# Patient Record
Sex: FEMALE | Race: WHITE | NOT HISPANIC OR LATINO | Employment: FULL TIME | URBAN - NONMETROPOLITAN AREA
[De-identification: names, ages, dates, MRNs, and addresses within clinical notes are randomized per-mention and may not be internally consistent; named-entity substitution may affect disease eponyms.]

---

## 2023-06-16 PROBLEM — Z12.11 ENCOUNTER FOR SCREENING COLONOSCOPY: Status: ACTIVE | Noted: 2023-06-16

## 2023-06-16 PROBLEM — Z11.59 ENCOUNTER FOR HEPATITIS C SCREENING TEST FOR LOW RISK PATIENT: Chronic | Status: ACTIVE | Noted: 2023-06-16

## 2023-06-16 PROBLEM — N92.0 MENORRHAGIA: Status: ACTIVE | Noted: 2023-06-16

## 2023-06-16 PROBLEM — R03.0 ELEVATED BLOOD-PRESSURE READING WITHOUT DIAGNOSIS OF HYPERTENSION: Status: ACTIVE | Noted: 2023-06-16

## 2023-06-16 PROBLEM — R31.9 HEMATURIA: Status: ACTIVE | Noted: 2023-06-16

## 2023-06-16 PROBLEM — D50.9 IRON DEFICIENCY ANEMIA: Status: ACTIVE | Noted: 2023-06-16

## 2023-06-16 PROBLEM — E55.9 VITAMIN D DEFICIENCY: Status: ACTIVE | Noted: 2023-06-16

## 2023-06-16 PROBLEM — Z11.59 ENCOUNTER FOR HEPATITIS C SCREENING TEST FOR LOW RISK PATIENT: Status: ACTIVE | Noted: 2023-06-16

## 2023-06-16 PROBLEM — M25.50 ARTHRALGIA OF MULTIPLE JOINTS: Status: ACTIVE | Noted: 2023-06-16

## 2023-06-16 PROBLEM — Z12.11 ENCOUNTER FOR SCREENING COLONOSCOPY: Chronic | Status: ACTIVE | Noted: 2023-06-16

## 2023-06-16 RX ORDER — MULTIVITAMIN
TABLET ORAL
COMMUNITY

## 2023-06-16 RX ORDER — FERROUS SULFATE 325(65) MG
TABLET ORAL
COMMUNITY
End: 2023-06-20 | Stop reason: WASHOUT

## 2023-06-16 RX ORDER — TRANEXAMIC ACID 650 MG/1
TABLET ORAL
COMMUNITY
Start: 2021-05-20 | End: 2023-06-20 | Stop reason: WASHOUT

## 2023-06-18 NOTE — PROGRESS NOTES
"HPI:    Milagros Henson is a 38 y.o. female is here today for PE/health maintenance    GYN- Dr Farrell- menorrhagia, anemia    Tdap 2014 2021 Dr Booker colonoscopy      Pt has noticed some reflux /gerd- discussed starting omeprazole and following up with GI       Pt also due for f/up chronic medical problems-elevated BP, vit d , allergies     Other medical specialists are involved in patient's care.    Any recent notes that were available were reviewed.    All conditions are monitored, evaluated and assessed regularly.    Patient is compliant with current treatment regimen/medications.    Specialists involved include:      Dr Galicia    Health Maintenance   Topic     Yearly Adult Physical  today    Hepatitis C Screening  Completed    HIB Vaccines  Aged Out    IPV Vaccines  Aged Out    Hepatitis A Vaccines  Aged Out    Meningococcal Vaccine  Aged Out    Rotavirus Vaccines  Aged Out    HPV Vaccines  Aged Out    Pneumococcal Vaccine: Pediatrics (0 to 5 Years) and At-Risk Patients (6 to 64 Years)  Aged Out    Irritable Bowel Syndrome  Discontinued         Immunization History   Administered Date(s) Administered    Moderna SARS-CoV-2 Vaccination 04/01/2021, 04/29/2021         Current Outpatient Medications   Medication Sig Dispense Refill    cetirizine (ZyrTEC) 10 mg capsule Take by mouth.      ferrous sulfate 325 (65 Fe) MG tablet Take by mouth.      Lysteda 650 mg tablet tablet Take by mouth.      multivitamin tablet Take by mouth.           Social History     Tobacco Use   Smoking Status Never    Passive exposure: Never   Smokeless Tobacco Never     Pt gardens      ROS:      PE:    Vitals:    06/20/23 0631   BP: 128/86   BP Location: Left arm   Patient Position: Sitting   BP Cuff Size: Adult   Pulse: 84   Resp: 16   Temp: 36.9 °C (98.4 °F)   TempSrc: Temporal   SpO2: 100%   Weight: 73.9 kg (162 lb 14.4 oz)   Height: 1.727 m (5' 8\")               Pt is A and O x3, NAD  Head- normocephalic and atraumatic, "   EYES- conjunctiva- normal   lids- normal  EARS/NOSE- TM's normal, nasopharynx- normal and atraumatic  OROPHARYNX- normal  NECK- supple, FROM  THYROID- NT, normal size, no nodule noted  LYMPH- no cervical lymph nodes palpated   CV- RRR without murmur  PULM- CTA bilaterally, normal respiratory effort  RESPIRATORY EFFORT- normal , no retractions or nasal flaring   ABD- normoactive BS's , soft , NT, no hepatosplenomegaly palpated  EXT- no edema,NT  SKIN- no abnormal skin lesions noted  NEURO- no focal deficits  PSYCH- pleasant, normal judgement and insight      The number and complexity of problems addressed is considered moderate.  The amount and/or complexity of data reviewed and analyzed is considered moderate. The risk of complications and/or morbidity/mortality of patient is considered moderate. Overall, this patient encounter is considered a moderate risk visit.        ASSESSMENT/PLAN:    Problem List Items Addressed This Visit    None  Visit Diagnoses       Well adult exam    -  Primary    Relevant Orders    Follow Up In [facility] - PCP    Screening for cholesterol level        Relevant Orders    Lipid Panel    Screening for diabetes mellitus        Relevant Orders    Glucose    Gastroesophageal reflux disease, unspecified whether esophagitis present        Relevant Medications    omeprazole (PriLOSEC) 20 mg DR capsule            Orders Placed This Encounter   Procedures    Lipid Panel    Glucose     Start omeprazole       Schedule follow up with Dr Booker     Follow up in 12 months - PE

## 2023-06-19 ASSESSMENT — PROMIS GLOBAL HEALTH SCALE
RATE_AVERAGE_PAIN: 2
RATE_GENERAL_HEALTH: GOOD
EMOTIONAL_PROBLEMS: NEVER
RATE_MENTAL_HEALTH: GOOD
RATE_PHYSICAL_HEALTH: GOOD
CARRYOUT_SOCIAL_ACTIVITIES: GOOD
CARRYOUT_PHYSICAL_ACTIVITIES: COMPLETELY
RATE_SOCIAL_SATISFACTION: GOOD
RATE_AVERAGE_FATIGUE: MILD
RATE_QUALITY_OF_LIFE: GOOD

## 2023-06-20 ENCOUNTER — OFFICE VISIT (OUTPATIENT)
Dept: PRIMARY CARE | Facility: CLINIC | Age: 38
End: 2023-06-20
Payer: COMMERCIAL

## 2023-06-20 VITALS
WEIGHT: 162.9 LBS | RESPIRATION RATE: 16 BRPM | DIASTOLIC BLOOD PRESSURE: 86 MMHG | SYSTOLIC BLOOD PRESSURE: 128 MMHG | HEART RATE: 84 BPM | HEIGHT: 68 IN | BODY MASS INDEX: 24.69 KG/M2 | TEMPERATURE: 98.4 F | OXYGEN SATURATION: 100 %

## 2023-06-20 DIAGNOSIS — Z13.1 SCREENING FOR DIABETES MELLITUS: ICD-10-CM

## 2023-06-20 DIAGNOSIS — Z00.00 WELL ADULT EXAM: Primary | ICD-10-CM

## 2023-06-20 DIAGNOSIS — K21.9 GASTROESOPHAGEAL REFLUX DISEASE, UNSPECIFIED WHETHER ESOPHAGITIS PRESENT: ICD-10-CM

## 2023-06-20 DIAGNOSIS — Z13.220 SCREENING FOR CHOLESTEROL LEVEL: ICD-10-CM

## 2023-06-20 LAB
CHOLESTEROL (MG/DL) IN SER/PLAS: 139 MG/DL (ref 0–199)
CHOLESTEROL IN HDL (MG/DL) IN SER/PLAS: 57.8 MG/DL
CHOLESTEROL/HDL RATIO: 2.4
GLUCOSE (MG/DL) IN SER/PLAS: 95 MG/DL (ref 74–99)
LDL: 43 MG/DL (ref 0–99)
TRIGLYCERIDE (MG/DL) IN SER/PLAS: 191 MG/DL (ref 0–149)
VLDL: 38 MG/DL (ref 0–40)

## 2023-06-20 PROCEDURE — 80061 LIPID PANEL: CPT

## 2023-06-20 PROCEDURE — 99395 PREV VISIT EST AGE 18-39: CPT | Performed by: FAMILY MEDICINE

## 2023-06-20 PROCEDURE — 82947 ASSAY GLUCOSE BLOOD QUANT: CPT

## 2023-06-20 PROCEDURE — 1036F TOBACCO NON-USER: CPT | Performed by: FAMILY MEDICINE

## 2023-06-20 RX ORDER — OMEPRAZOLE 20 MG/1
20 CAPSULE, DELAYED RELEASE ORAL 2 TIMES DAILY
Qty: 60 CAPSULE | Refills: 1 | Status: SHIPPED | OUTPATIENT
Start: 2023-06-20 | End: 2024-04-22

## 2023-06-20 RX ORDER — SEGESTERONE ACETATE AND ETHINYL ESTRADIOL 103; 17.4 MG/1; MG/1
RING VAGINAL
COMMUNITY

## 2023-07-14 ENCOUNTER — OFFICE (OUTPATIENT)
Dept: URBAN - METROPOLITAN AREA CLINIC 26 | Facility: CLINIC | Age: 38
End: 2023-07-14

## 2023-07-14 VITALS
HEIGHT: 68 IN | DIASTOLIC BLOOD PRESSURE: 109 MMHG | SYSTOLIC BLOOD PRESSURE: 171 MMHG | TEMPERATURE: 97.8 F | WEIGHT: 165 LBS | HEART RATE: 77 BPM

## 2023-07-14 DIAGNOSIS — K22.70 BARRETT'S ESOPHAGUS WITHOUT DYSPLASIA: ICD-10-CM

## 2023-07-14 DIAGNOSIS — R12 HEARTBURN: ICD-10-CM

## 2023-07-14 DIAGNOSIS — R19.4 CHANGE IN BOWEL HABIT: ICD-10-CM

## 2023-07-14 DIAGNOSIS — Z87.19 PERSONAL HISTORY OF OTHER DISEASES OF THE DIGESTIVE SYSTEM: ICD-10-CM

## 2023-07-14 PROCEDURE — 99214 OFFICE O/P EST MOD 30 MIN: CPT | Performed by: NURSE PRACTITIONER

## 2023-07-14 RX ORDER — OMEPRAZOLE 20 MG/1
40 CAPSULE, DELAYED RELEASE ORAL
Qty: 60 | Refills: 3 | Status: CANCELLED
End: 2024-03-19

## 2023-08-14 VITALS
DIASTOLIC BLOOD PRESSURE: 103 MMHG | SYSTOLIC BLOOD PRESSURE: 125 MMHG | DIASTOLIC BLOOD PRESSURE: 95 MMHG | HEART RATE: 90 BPM | RESPIRATION RATE: 14 BRPM | SYSTOLIC BLOOD PRESSURE: 130 MMHG | HEART RATE: 80 BPM | RESPIRATION RATE: 15 BRPM | RESPIRATION RATE: 23 BRPM | RESPIRATION RATE: 14 BRPM | TEMPERATURE: 97.8 F | DIASTOLIC BLOOD PRESSURE: 88 MMHG | SYSTOLIC BLOOD PRESSURE: 154 MMHG | SYSTOLIC BLOOD PRESSURE: 136 MMHG | SYSTOLIC BLOOD PRESSURE: 163 MMHG | SYSTOLIC BLOOD PRESSURE: 136 MMHG | DIASTOLIC BLOOD PRESSURE: 84 MMHG | RESPIRATION RATE: 12 BRPM | DIASTOLIC BLOOD PRESSURE: 88 MMHG | DIASTOLIC BLOOD PRESSURE: 101 MMHG | DIASTOLIC BLOOD PRESSURE: 92 MMHG | HEART RATE: 80 BPM | DIASTOLIC BLOOD PRESSURE: 103 MMHG | RESPIRATION RATE: 21 BRPM | DIASTOLIC BLOOD PRESSURE: 104 MMHG | RESPIRATION RATE: 23 BRPM | HEART RATE: 90 BPM | DIASTOLIC BLOOD PRESSURE: 83 MMHG | SYSTOLIC BLOOD PRESSURE: 145 MMHG | DIASTOLIC BLOOD PRESSURE: 88 MMHG | SYSTOLIC BLOOD PRESSURE: 131 MMHG | SYSTOLIC BLOOD PRESSURE: 172 MMHG | RESPIRATION RATE: 13 BRPM | RESPIRATION RATE: 13 BRPM | SYSTOLIC BLOOD PRESSURE: 163 MMHG | RESPIRATION RATE: 11 BRPM | RESPIRATION RATE: 22 BRPM | RESPIRATION RATE: 21 BRPM | DIASTOLIC BLOOD PRESSURE: 95 MMHG | HEART RATE: 88 BPM | HEART RATE: 89 BPM | OXYGEN SATURATION: 100 % | TEMPERATURE: 97.8 F | SYSTOLIC BLOOD PRESSURE: 145 MMHG | OXYGEN SATURATION: 99 % | HEART RATE: 80 BPM | HEART RATE: 89 BPM | DIASTOLIC BLOOD PRESSURE: 84 MMHG | DIASTOLIC BLOOD PRESSURE: 104 MMHG | HEART RATE: 96 BPM | DIASTOLIC BLOOD PRESSURE: 92 MMHG | RESPIRATION RATE: 22 BRPM | RESPIRATION RATE: 17 BRPM | HEART RATE: 96 BPM | SYSTOLIC BLOOD PRESSURE: 145 MMHG | RESPIRATION RATE: 21 BRPM | HEART RATE: 84 BPM | SYSTOLIC BLOOD PRESSURE: 172 MMHG | DIASTOLIC BLOOD PRESSURE: 107 MMHG | HEIGHT: 68 IN | DIASTOLIC BLOOD PRESSURE: 83 MMHG | SYSTOLIC BLOOD PRESSURE: 130 MMHG | DIASTOLIC BLOOD PRESSURE: 102 MMHG | RESPIRATION RATE: 22 BRPM | RESPIRATION RATE: 15 BRPM | DIASTOLIC BLOOD PRESSURE: 84 MMHG | HEART RATE: 109 BPM | SYSTOLIC BLOOD PRESSURE: 154 MMHG | RESPIRATION RATE: 13 BRPM | DIASTOLIC BLOOD PRESSURE: 107 MMHG | HEART RATE: 109 BPM | SYSTOLIC BLOOD PRESSURE: 137 MMHG | HEART RATE: 84 BPM | HEART RATE: 88 BPM | HEART RATE: 89 BPM | SYSTOLIC BLOOD PRESSURE: 125 MMHG | HEART RATE: 90 BPM | RESPIRATION RATE: 17 BRPM | SYSTOLIC BLOOD PRESSURE: 163 MMHG | HEART RATE: 85 BPM | SYSTOLIC BLOOD PRESSURE: 128 MMHG | OXYGEN SATURATION: 99 % | HEIGHT: 68 IN | SYSTOLIC BLOOD PRESSURE: 172 MMHG | OXYGEN SATURATION: 99 % | OXYGEN SATURATION: 100 % | DIASTOLIC BLOOD PRESSURE: 107 MMHG | SYSTOLIC BLOOD PRESSURE: 154 MMHG | DIASTOLIC BLOOD PRESSURE: 83 MMHG | RESPIRATION RATE: 12 BRPM | HEART RATE: 84 BPM | RESPIRATION RATE: 14 BRPM | DIASTOLIC BLOOD PRESSURE: 104 MMHG | RESPIRATION RATE: 11 BRPM | RESPIRATION RATE: 15 BRPM | SYSTOLIC BLOOD PRESSURE: 136 MMHG | RESPIRATION RATE: 23 BRPM | DIASTOLIC BLOOD PRESSURE: 102 MMHG | SYSTOLIC BLOOD PRESSURE: 125 MMHG | HEIGHT: 68 IN | SYSTOLIC BLOOD PRESSURE: 137 MMHG | TEMPERATURE: 97.8 F | RESPIRATION RATE: 18 BRPM | HEART RATE: 96 BPM | HEART RATE: 85 BPM | HEART RATE: 88 BPM | SYSTOLIC BLOOD PRESSURE: 128 MMHG | RESPIRATION RATE: 17 BRPM | SYSTOLIC BLOOD PRESSURE: 131 MMHG | DIASTOLIC BLOOD PRESSURE: 95 MMHG | RESPIRATION RATE: 12 BRPM | SYSTOLIC BLOOD PRESSURE: 128 MMHG | SYSTOLIC BLOOD PRESSURE: 137 MMHG | DIASTOLIC BLOOD PRESSURE: 101 MMHG | RESPIRATION RATE: 18 BRPM | HEART RATE: 109 BPM | SYSTOLIC BLOOD PRESSURE: 131 MMHG | RESPIRATION RATE: 11 BRPM | DIASTOLIC BLOOD PRESSURE: 101 MMHG | HEART RATE: 85 BPM | DIASTOLIC BLOOD PRESSURE: 92 MMHG | DIASTOLIC BLOOD PRESSURE: 102 MMHG | DIASTOLIC BLOOD PRESSURE: 103 MMHG | RESPIRATION RATE: 18 BRPM | SYSTOLIC BLOOD PRESSURE: 130 MMHG | OXYGEN SATURATION: 100 %

## 2023-08-17 ENCOUNTER — AMBULATORY SURGICAL CENTER (OUTPATIENT)
Dept: URBAN - METROPOLITAN AREA SURGERY 12 | Facility: SURGERY | Age: 38
End: 2023-08-17

## 2023-08-17 ENCOUNTER — OFFICE (OUTPATIENT)
Dept: URBAN - METROPOLITAN AREA PATHOLOGY 2 | Facility: PATHOLOGY | Age: 38
End: 2023-08-17

## 2023-08-17 ENCOUNTER — AMBULATORY SURGICAL CENTER (OUTPATIENT)
Dept: URBAN - METROPOLITAN AREA SURGERY 12 | Facility: SURGERY | Age: 38
End: 2023-08-17
Payer: COMMERCIAL

## 2023-08-17 DIAGNOSIS — K44.9 DIAPHRAGMATIC HERNIA WITHOUT OBSTRUCTION OR GANGRENE: ICD-10-CM

## 2023-08-17 DIAGNOSIS — K22.2 ESOPHAGEAL OBSTRUCTION: ICD-10-CM

## 2023-08-17 DIAGNOSIS — K29.80 DUODENITIS WITHOUT BLEEDING: ICD-10-CM

## 2023-08-17 DIAGNOSIS — K21.00 GASTRO-ESOPHAGEAL REFLUX DISEASE WITH ESOPHAGITIS, WITHOUT B: ICD-10-CM

## 2023-08-17 DIAGNOSIS — K31.89 OTHER DISEASES OF STOMACH AND DUODENUM: ICD-10-CM

## 2023-08-17 DIAGNOSIS — K22.70 BARRETT'S ESOPHAGUS WITHOUT DYSPLASIA: ICD-10-CM

## 2023-08-17 PROBLEM — K22.89 OTHER SPECIFIED DISEASE OF ESOPHAGUS: Status: ACTIVE | Noted: 2023-08-17

## 2023-08-17 PROCEDURE — 88313 SPECIAL STAINS GROUP 2: CPT | Performed by: PATHOLOGY

## 2023-08-17 PROCEDURE — 88342 IMHCHEM/IMCYTCHM 1ST ANTB: CPT | Performed by: PATHOLOGY

## 2023-08-17 PROCEDURE — 88305 TISSUE EXAM BY PATHOLOGIST: CPT | Performed by: PATHOLOGY

## 2023-08-17 PROCEDURE — 43450 DILATE ESOPHAGUS 1/MULT PASS: CPT | Performed by: INTERNAL MEDICINE

## 2023-08-17 PROCEDURE — 43239 EGD BIOPSY SINGLE/MULTIPLE: CPT | Performed by: INTERNAL MEDICINE

## 2023-08-23 ENCOUNTER — TELEPHONE (OUTPATIENT)
Dept: PRIMARY CARE | Facility: CLINIC | Age: 38
End: 2023-08-23
Payer: COMMERCIAL

## 2023-08-23 ASSESSMENT — ENCOUNTER SYMPTOMS
NAUSEA: 0
FLANK PAIN: 1
CHILLS: 0
FREQUENCY: 1
VOMITING: 0
DYSURIA: 1
HEMATURIA: 0
SWEATS: 0

## 2023-08-24 ENCOUNTER — OFFICE VISIT (OUTPATIENT)
Dept: PRIMARY CARE | Facility: CLINIC | Age: 38
End: 2023-08-24
Payer: COMMERCIAL

## 2023-08-24 VITALS
OXYGEN SATURATION: 100 % | WEIGHT: 165 LBS | DIASTOLIC BLOOD PRESSURE: 80 MMHG | RESPIRATION RATE: 12 BRPM | BODY MASS INDEX: 25.09 KG/M2 | HEART RATE: 82 BPM | TEMPERATURE: 98.7 F | SYSTOLIC BLOOD PRESSURE: 138 MMHG

## 2023-08-24 DIAGNOSIS — R30.0 DYSURIA: ICD-10-CM

## 2023-08-24 LAB
APPEARANCE, URINE: ABNORMAL
BACTERIA, URINE: ABNORMAL /HPF
BILIRUBIN, URINE: NEGATIVE
BLOOD, URINE: ABNORMAL
COLOR, URINE: YELLOW
GLUCOSE, URINE: NEGATIVE MG/DL
KETONES, URINE: NEGATIVE MG/DL
LEUKOCYTE ESTERASE, URINE: ABNORMAL
MUCUS, URINE: ABNORMAL /LPF
NITRITE, URINE: NEGATIVE
PH, URINE: 6 (ref 5–8)
POC APPEARANCE, URINE: CLEAR
POC BILIRUBIN, URINE: NEGATIVE
POC BLOOD, URINE: ABNORMAL
POC COLOR, URINE: YELLOW
POC GLUCOSE, URINE: NEGATIVE MG/DL
POC KETONES, URINE: NEGATIVE MG/DL
POC LEUKOCYTES, URINE: ABNORMAL
POC NITRITE,URINE: NEGATIVE
POC PH, URINE: 6.5 PH
POC PROTEIN, URINE: NEGATIVE MG/DL
POC SPECIFIC GRAVITY, URINE: <=1.005
POC UROBILINOGEN, URINE: 0.2 EU/DL
PROTEIN, URINE: NEGATIVE MG/DL
RBC, URINE: 4 /HPF (ref 0–5)
SPECIFIC GRAVITY, URINE: 1 (ref 1–1.03)
SQUAMOUS EPITHELIAL CELLS, URINE: <1 /HPF
UROBILINOGEN, URINE: <2 MG/DL (ref 0–1.9)
WBC, URINE: 56 /HPF (ref 0–5)

## 2023-08-24 PROCEDURE — 87186 SC STD MICRODIL/AGAR DIL: CPT

## 2023-08-24 PROCEDURE — 81001 URINALYSIS AUTO W/SCOPE: CPT

## 2023-08-24 PROCEDURE — 99214 OFFICE O/P EST MOD 30 MIN: CPT | Performed by: FAMILY MEDICINE

## 2023-08-24 PROCEDURE — 87086 URINE CULTURE/COLONY COUNT: CPT

## 2023-08-24 PROCEDURE — 1036F TOBACCO NON-USER: CPT | Performed by: FAMILY MEDICINE

## 2023-08-24 PROCEDURE — 81003 URINALYSIS AUTO W/O SCOPE: CPT | Performed by: FAMILY MEDICINE

## 2023-08-24 PROCEDURE — 87077 CULTURE AEROBIC IDENTIFY: CPT

## 2023-08-24 RX ORDER — CIPROFLOXACIN 250 MG/1
250 TABLET, FILM COATED ORAL 2 TIMES DAILY
Qty: 10 TABLET | Refills: 0 | Status: SHIPPED | OUTPATIENT
Start: 2023-08-24 | End: 2023-08-29

## 2023-08-24 ASSESSMENT — ENCOUNTER SYMPTOMS
FLANK PAIN: 1
VOMITING: 0
HEMATURIA: 0
SWEATS: 0
NAUSEA: 0
FREQUENCY: 1
CHILLS: 0
DYSURIA: 1

## 2023-08-24 NOTE — PROGRESS NOTES
Subjective   Patient ID: Milagros eHnson is a 38 y.o. female who presents for UTI.    Answers submitted by the patient for this visit:  Painful Urination Questionnaire (Submitted on 8/23/2023)  Chief Complaint: Dysuria  Chronicity: recurrent  Onset: in the past 7 days  Frequency: every urination  Progression since onset: waxing and waning  Pain quality: stabbing  Pain - numeric: 5/10  Fever: no fever  Sexually active?: Yes  History of pyelonephritis?: No  chills: No  discharge: No  flank pain: Yes  frequency: Yes  hematuria: No  hesitancy: No  nausea: No  possible pregnancy: No  sweats: No  urgency: Yes  vomiting: No        Yesterday had bilateral flank pain  Today feeling some better  Recalls last UTI associated with kidney stone - saw Dr Alcazar, urology -had cystoscopy, no issues found      Difficulty Urinating   This is a recurrent problem. The current episode started in the past 7 days. The problem occurs every urination. The problem has been waxing and waning. The quality of the pain is described as stabbing. The pain is at a severity of 5/10. There has been no fever. She is Sexually active. There is No history of pyelonephritis. Associated symptoms include flank pain, frequency and urgency. Pertinent negatives include no chills, discharge, hematuria, hesitancy, nausea, possible pregnancy, sweats or vomiting.        Review of Systems   Constitutional:  Negative for chills.   Gastrointestinal:  Negative for nausea and vomiting.   Genitourinary:  Positive for dysuria, flank pain, frequency and urgency. Negative for hematuria and hesitancy.       Objective   There were no vitals taken for this visit.    Physical Exam  Constitutional:       Appearance: Normal appearance. She is not ill-appearing.   Abdominal:      General: Abdomen is flat.      Palpations: Abdomen is soft.      Tenderness: There is no abdominal tenderness. There is right CVA tenderness and left CVA tenderness.   Neurological:      Mental  Status: She is alert.       Results for orders placed or performed in visit on 08/24/23   POCT UA Automated manually resulted   Result Value Ref Range    POC Color, Urine Yellow Straw, Yellow, Light Yellow    POC Appearance, Urine Clear Clear    POC Specific Gravity, Urine <=1.005 1.005 - 1.035    POC PH, Urine 6.5 No Reference Range Established PH    POC Protein, Urine NEGATIVE NEGATIVE, 30 (1+) mg/dl    POC Glucose, Urine NEGATIVE NEGATIVE mg/dl    POC Blood, Urine MODERATE (2+) (A) NEGATIVE    POC Ketones, Urine NEGATIVE NEGATIVE mg/dl    POC Bilirubin, Urine NEGATIVE NEGATIVE    POC Urobilinogen, Urine 0.2 0.2, 1.0 EU/DL    Poc Nitrate, Urine NEGATIVE NEGATIVE    POC Leukocytes, Urine MODERATE (2+) (A) NEGATIVE         Assessment/Plan   Problem List Items Addressed This Visit       Dysuria    Relevant Medications    ciprofloxacin (Cipro) 250 mg tablet    Other Relevant Orders    POCT UA Automated manually resulted (Completed)    Urinalysis with Reflex Microscopic and Culture

## 2023-08-28 LAB — URINE CULTURE: ABNORMAL

## 2023-11-06 ASSESSMENT — ENCOUNTER SYMPTOMS
COUGH: 1
SORE THROAT: 1
RHINORRHEA: 1

## 2023-11-06 NOTE — PROGRESS NOTES
Subjective      HPI:    Milagros Henson. Is 38 y.o.. female. Here for acute visit-           Chief Complaint   Patient presents with    URI    Sore Throat     Congestion, nasal and chest         What concern/ problem/pain/symptom  brings you here today?  Believes she has sinus infection      how long has pt had sxs? 6 days      describe symptoms-    fever-  no    how often do symptoms occur?on and off       has pt tried anything for current symptoms, including medications (OTC or prescription)  ?  Sudafed      what makes symptoms worse?  Laying down to sleep      has pt been seen recently for this problem ( within past 2-3 weeks) ? no    if yes- where?    by who?    what treatment was provided?      Pt has decreased caffeine intake       Fam History- HTN       Social History     Tobacco Use   Smoking Status Never    Passive exposure: Never   Smokeless Tobacco Never        reports current alcohol use.       Objective            Vitals:    11/07/23 0657   BP: (!) 151/101   BP Location: Left arm   Patient Position: Sitting   BP Cuff Size: Adult   Pulse: 105   Temp: 37.5 °C (99.5 °F)   TempSrc: Temporal   SpO2: 96%   Weight: 76.9 kg (169 lb 9.6 oz)           PHYSICAL:      Pt is A and O x3, NAD, Vital signs are within normal limits  General Appearance- normal , good hygiene,talks easily  EYES- conjunctiva and lids- normal  EARS/NOSE-TM's normal, head normocephalic and atraumatic, nasopharynx-green nasal discharge, no trismus, no hot potato voice  OROPHARYNX- no exudate  NECK- supple, FROM  LYMPH- no cervical lymph nodes palpated   CV- RRR without murmur  PULM- CTA bilaterally  Respiratory effort- normal respiratory effort , no retractions or nasal flaring   ABDOMEN- normoactive BS's   EXTREMITIES-no edema,NT  SKIN- no abnormal skin lesions on inspection or palpation   NEURO- no focal deficits  PSYCH- pleasant, normal judgement and insight      BP Readings from Last 3 Encounters:   11/07/23 (!) 151/101   08/24/23  138/80   06/20/23 128/86         Wt Readings from Last 3 Encounters:   11/07/23 76.9 kg (169 lb 9.6 oz)   08/24/23 74.8 kg (165 lb)   06/20/23 73.9 kg (162 lb 14.4 oz)       BMI Readings from Last 3 Encounters:   11/07/23 25.79 kg/m²   08/24/23 25.09 kg/m²   06/20/23 24.77 kg/m²           The number and complexity of problems addressed is considered moderate.  The amount and/or complexity of data reviewed and analyzed is considered moderate. The risk of complications and/or morbidity/mortality of patient is considered moderate. Overall, this patient encounter is considered a moderate risk visit.      What are antibiotics?  Antibiotics are medicines that help people fight infections caused by bacteria. They work by killing bacteria that are in the body. These medicines come in many different forms, including pills, ointments, and liquids that are given by injection.    Antibiotics can do a lot of good. For people with serious bacterial infections, antibiotics can save lives. But people use them far too often, even when they're not needed. This is causing a very serious problem called antibiotic resistance. Antibiotic resistance happens when bacteria that have been exposed to an antibiotic change so that the antibiotic can no longer kill them. In those bacteria, the antibiotic has no effect. Because of this problem, doctors are having a harder and harder time treating infections. Experts worry that there will soon be infections that don't respond to any antibiotics.    When are antibiotics helpful?  Antibiotics can help people fight off infections caused by bacteria. They do not work on infections caused by viruses.    Some common bacterial infections that are treated with antibiotics include:    Strep throat    Pneumonia (an infection of the lungs)    Bladder infections    Infections you catch through sex, such as gonorrhea and chlamydia    When are antibiotics NOT helpful?    Antibiotics do not work on infections  caused by viruses.    Antibiotics are not helpful for the common cold, because the common cold is caused by a virus.    Antibiotics are not helpful for the flu, because the flu is caused by a virus. (People with the flu can be treated with medicines called antiviral medicines, which are different from antibiotics.)      Even though antibiotics don't work on infections caused by viruses, people sometimes believe that they do. That's because they took antibiotics for a viral infection before and then got better. The problem is that those people would have gotten better with or without an antibiotic. When they get better with the antibiotic, they think that's what cured them, when in reality the antibiotic had nothing to do with it.    What's the harm in taking antibiotics even if they might not help?  There are many reasons you should not take antibiotics unless you absolutely need them:    Antibiotics cause side effects, such as nausea, vomiting, and diarrhea. They can even make it more likely that you will get a different kind of infection, such as yeast infection (if you are a woman).    Allergies to antibiotics are common. You can develop an allergy to an antibiotic, even if you have not had a problem with it before. Some allergies are just unpleasant, causing rashes and itching. But some can be very serious and even life-threatening. It is better to avoid any risk of an allergy, if the antibiotic wouldn't help you anyway.    Overuse of antibiotics leads to antibiotic resistance. Using antibiotics when they are not needed gives bacteria a chance to change, so that the antibiotics cannot hurt them later on. People who have infections caused by antibiotic-resistant bacteria often have to be treated in the hospital with many different antibiotics. People can even die from these infections, because there is no antibiotic that will cure them.    When should I take antibiotics?  You should take antibiotics only when a  "doctor or nurse prescribes them to you. You should never take antibiotics prescribed to someone else, and you should not take antibiotics that were prescribed to you for a previous illness. When prescribing an antibiotic, doctors and nurses have to carefully pick the right antibiotic for a particular infection. Not all antibiotics work on all bacteria.    If you do have an infection caused by a bacteria, your doctor or nurse might want to find out what that bacteria is, and which antibiotics can kill it. They do this by taking a \"culture\" of the bacteria and growing it in the lab. But it's not possible to do a culture on someone who has already started an antibiotic. So if you start an antibiotic without input from a doctor or nurse it will be impossible to know if you have taken the right one.    What can I do to reduce antibiotic resistance?  Here are some things that can help:    Do not pressure your doctor or nurse for antibiotics when they do not think you need them.    If you are prescribed antibiotics, finish all of the medicine and take it exactly as directed. Never skip doses or stop taking the medicine without talking to your doctor or nurse.    Do not give antibiotics that were prescribed to you to anyone else.    What if my doctor prescribes an antibiotic that did not work for me before?  If an antibiotic did not work for you before, that does not mean it will never work for you. If you have used an antibiotic before and it did not work, tell your doctor. But keep in mind that the infection you had before might not have been caused by the same bacteria that you have now. The \"best\" antibiotic is the right one for the bacteria causing the infection, not for the person with the infection.    What if I am allergic to an antibiotic?  If you had a bad reaction to an antibiotic, tell your doctor or nurse about it. But do not assume you are allergic unless your doctor or nurse tells you that you are.    Many " people who think they are allergic to an antibiotic are wrong. If you get nauseous after taking an antibiotic, that does not mean you are allergic to it. Nausea is a common side effect of many antibiotics. If you are a woman and you get a yeast infection after taking an antibiotic, that does not mean you are allergic to it. Yeast infections are a common side effect of antibiotics.    Symptoms of antibiotic allergy can be mild and include a flat rash and itching. They can also be more serious and include:    Hives - Hives are raised, red patches of skin that are usually very itchy (picture 1).    Lip or tongue swelling    Trouble swallowing or breathing    Serious allergy symptoms can start right after you start taking an antibiotic if you are very sensitive. Less serious symptoms, on the other hand, often start a day or more later.    Almost all antibiotics may cause possible side effects of allergic reaction, nausea, vomiting, diarrhea- most worrisome caused by C. diff, and secondary yeast infection.        Assessment/Plan        1. Other acute sinusitis, recurrence not specified  amoxicillin (Amoxil) 500 mg capsule      2. Pharyngitis, unspecified etiology  amoxicillin (Amoxil) 500 mg capsule      3. Nasal congestion  amoxicillin (Amoxil) 500 mg capsule      4. Elevated blood-pressure reading without diagnosis of hypertension  Follow Up In [facility] - PCP - Established                Stop sudafed      Avoid salt and caffeine         Start amoxicillin      Call if no better or if symptoms worsen        Follow up 4-6 weeks - recheck BP

## 2023-11-07 ENCOUNTER — OFFICE VISIT (OUTPATIENT)
Dept: PRIMARY CARE | Facility: CLINIC | Age: 38
End: 2023-11-07
Payer: COMMERCIAL

## 2023-11-07 VITALS
HEART RATE: 105 BPM | DIASTOLIC BLOOD PRESSURE: 101 MMHG | WEIGHT: 169.6 LBS | OXYGEN SATURATION: 96 % | SYSTOLIC BLOOD PRESSURE: 151 MMHG | TEMPERATURE: 99.5 F | BODY MASS INDEX: 25.79 KG/M2

## 2023-11-07 DIAGNOSIS — J02.9 PHARYNGITIS, UNSPECIFIED ETIOLOGY: ICD-10-CM

## 2023-11-07 DIAGNOSIS — R03.0 ELEVATED BLOOD-PRESSURE READING WITHOUT DIAGNOSIS OF HYPERTENSION: ICD-10-CM

## 2023-11-07 DIAGNOSIS — R09.81 NASAL CONGESTION: ICD-10-CM

## 2023-11-07 DIAGNOSIS — J01.80 OTHER ACUTE SINUSITIS, RECURRENCE NOT SPECIFIED: Primary | ICD-10-CM

## 2023-11-07 PROCEDURE — 99214 OFFICE O/P EST MOD 30 MIN: CPT | Performed by: FAMILY MEDICINE

## 2023-11-07 PROCEDURE — 1036F TOBACCO NON-USER: CPT | Performed by: FAMILY MEDICINE

## 2023-11-07 RX ORDER — AMOXICILLIN 500 MG/1
1000 CAPSULE ORAL 2 TIMES DAILY
Qty: 40 CAPSULE | Refills: 0 | Status: SHIPPED | OUTPATIENT
Start: 2023-11-07 | End: 2023-11-14 | Stop reason: SDUPTHER

## 2023-11-09 ENCOUNTER — TELEPHONE (OUTPATIENT)
Dept: PRIMARY CARE | Facility: CLINIC | Age: 38
End: 2023-11-09
Payer: COMMERCIAL

## 2023-11-09 NOTE — TELEPHONE ENCOUNTER
----- Message from Loni Mayberry CMA sent at 11/9/2023  8:17 AM EST -----  Regarding: FW: Follow up  Contact: [phone]    ----- Message -----  From: Milagros Henson  Sent: 11/9/2023   8:17 AM EST  To: Do [facility] Clinical Support Staff  Subject: Follow up                                        I’m starting day 3 of the antibiotics and I am still seeing dark green mucus. I’m starting to wonder if this antibiotic is strong enough to fight this. What are your thoughts. I know typically when I’m on an antibiotic I see changes within the first 24 hours.

## 2023-11-14 ENCOUNTER — TELEPHONE (OUTPATIENT)
Dept: PRIMARY CARE | Facility: CLINIC | Age: 38
End: 2023-11-14
Payer: COMMERCIAL

## 2023-11-14 DIAGNOSIS — N76.0 ACUTE VAGINITIS: Primary | ICD-10-CM

## 2023-11-14 DIAGNOSIS — J01.80 OTHER ACUTE SINUSITIS, RECURRENCE NOT SPECIFIED: ICD-10-CM

## 2023-11-14 DIAGNOSIS — J02.9 PHARYNGITIS, UNSPECIFIED ETIOLOGY: ICD-10-CM

## 2023-11-14 DIAGNOSIS — R09.81 NASAL CONGESTION: ICD-10-CM

## 2023-11-14 RX ORDER — FLUCONAZOLE 150 MG/1
150 TABLET ORAL ONCE
Qty: 1 TABLET | Refills: 0 | Status: SHIPPED | OUTPATIENT
Start: 2023-11-14 | End: 2023-11-14

## 2023-11-14 RX ORDER — AMOXICILLIN 500 MG/1
1000 CAPSULE ORAL 2 TIMES DAILY
Qty: 40 CAPSULE | Refills: 0 | Status: SHIPPED | OUTPATIENT
Start: 2023-11-14 | End: 2023-11-24

## 2023-11-14 NOTE — TELEPHONE ENCOUNTER
----- Message from Corie Ayala CMA sent at 11/14/2023  2:04 PM EST -----  Regarding: FW: Follow up  Contact: [phone]    ----- Message -----  From: Milagros Henson  Sent: 11/14/2023   1:57 PM EST  To: Do Grant [place] [id number] Clinical Support Staff  Subject: Follow up                                        That is the same medication I have been on already wouldn’t I need to switch to something stronger to knock this out?

## 2023-11-14 NOTE — TELEPHONE ENCOUNTER
----- Message from Loni Mayberry CMA sent at 11/14/2023 11:29 AM EST -----  Regarding: FW: Follow up  Contact: [phone]    ----- Message -----  From: Milagros Henson  Sent: 11/14/2023  11:26 AM EST  To: Do Grant [facility] Clinical Support Staff  Subject: Follow up                                        If that is what she suggests then I suppose that’s what we should do

## 2023-11-14 NOTE — TELEPHONE ENCOUNTER
----- Message from Loni Mayberry CMA sent at 11/14/2023  9:56 AM EST -----  Regarding: FW: Follow up  Contact: [phone]    ----- Message -----  From: Milagros Henson  Sent: 11/14/2023   9:44 AM EST  To: Do Grant [facility] Clinical Support Staff  Subject: Follow up                                        I just wanted to let you know that I have improved however I am still seeing green mucus. I just don’t want this to come back in full force once I finish the antibiotic. However on the other had this antibiotic also caused me to get a yeast infection so I’m not sure how going straight on another antibiotic will effect my body. Please let me know your thoughts.

## 2023-12-16 NOTE — PROGRESS NOTES
Subjective        Milagros Henson. Is 38 y.o.. female. Here for follow up office visit-       Chief Complaint   Patient presents with    Blood Pressure Check       HPI:        Follow up for elevated BP    Pt was seen 11/7/23 for ST    BP was 151/101-    Pt is not currently on BP Medication       Pt is doing well today    Has been taking OTC cold medicine on and off - had decongestant in it - has s stopped at this time       Some sinus symptoms on and off , occ mild headache with sinus symptoms /facial pressure     Mother - SLE /autoimmune     Pt had autoimmune work up /labs 6/2022     0n contraceptive ring - since march - and had 2 normal BP in June and August     Slightly light-headed when took hot shower- no syncope, sat down and felt better       REVIEW OF SYSTEMS:        Objective      Vitals:    12/19/23 0626   BP: (!) 151/100   BP Location: Left arm   Patient Position: Sitting   BP Cuff Size: Adult   Pulse: 89   Resp: 16   Temp: 36.9 °C (98.5 °F)   TempSrc: Temporal   SpO2: 99%   Weight: 78.5 kg (173 lb)       PHYSICAL:      Patient is alert and oriented x 3 , NAD  HEAD- normocephalic and atraumatic   EYES-conjunctiva-normal, lids - normal  EARS/NOSE- normal external exam   NECK-supple,FROM  CV- RRR without murmur  PULM- CTA bilaterally, normal respiratory effort  RESPIRATORY EFFORT- normal , no retractions or nasal flaring   ABD- normoactive BS's   EXT- no edema,NT  SKIN- no abnormal skin lesions noted  NEURO- no focal deficits  PSYCH- pleasant, normal judgement and insight      BP Readings from Last 3 Encounters:   12/19/23 (!) 151/100   11/07/23 (!) 151/101   08/24/23 138/80       BP elevated again today       Wt Readings from Last 3 Encounters:   12/19/23 78.5 kg (173 lb)   11/07/23 76.9 kg (169 lb 9.6 oz)   08/24/23 74.8 kg (165 lb)           BMI Readings from Last 3 Encounters:   12/19/23 26.30 kg/m²   11/07/23 25.79 kg/m²   08/24/23 25.09 kg/m²               The number and complexity of problems  addressed is considered moderate.  The amount and/or complexity of data reviewed and analyzed is considered moderate. The risk of complications and/or morbidity/mortality of patient is considered moderate. Overall, this patient encounter is considered a moderate risk visit.          Assessment/Plan      Problem List Items Addressed This Visit          Active Problems    Elevated blood-pressure reading without diagnosis of hypertension - Primary    Relevant Orders    Basic Metabolic Panel    CBC    Hepatic Function Panel    Thyroid Stimulating Hormone    Follow Up In [facility] - PCP - Established       Orders Placed This Encounter   Procedures    Basic Metabolic Panel    CBC    Hepatic Function Panel    Thyroid Stimulating Hormone       Stay well- hydrated     Ordered labs             Follow up in 3-4 weeks - recheck BP - start Medication if BP remains elevated

## 2023-12-19 ENCOUNTER — OFFICE VISIT (OUTPATIENT)
Dept: PRIMARY CARE | Facility: CLINIC | Age: 38
End: 2023-12-19
Payer: COMMERCIAL

## 2023-12-19 VITALS
SYSTOLIC BLOOD PRESSURE: 151 MMHG | HEART RATE: 89 BPM | OXYGEN SATURATION: 99 % | BODY MASS INDEX: 26.3 KG/M2 | RESPIRATION RATE: 16 BRPM | DIASTOLIC BLOOD PRESSURE: 100 MMHG | TEMPERATURE: 98.5 F | WEIGHT: 173 LBS

## 2023-12-19 DIAGNOSIS — R03.0 ELEVATED BLOOD-PRESSURE READING WITHOUT DIAGNOSIS OF HYPERTENSION: Primary | ICD-10-CM

## 2023-12-19 DIAGNOSIS — D64.9 ANEMIA, UNSPECIFIED TYPE: ICD-10-CM

## 2023-12-19 PROCEDURE — 1036F TOBACCO NON-USER: CPT | Performed by: FAMILY MEDICINE

## 2023-12-19 PROCEDURE — 84443 ASSAY THYROID STIM HORMONE: CPT

## 2023-12-19 PROCEDURE — 82607 VITAMIN B-12: CPT

## 2023-12-19 PROCEDURE — 36415 COLL VENOUS BLD VENIPUNCTURE: CPT

## 2023-12-19 PROCEDURE — 82728 ASSAY OF FERRITIN: CPT

## 2023-12-19 PROCEDURE — 80053 COMPREHEN METABOLIC PANEL: CPT

## 2023-12-19 PROCEDURE — 85027 COMPLETE CBC AUTOMATED: CPT

## 2023-12-19 PROCEDURE — 82746 ASSAY OF FOLIC ACID SERUM: CPT

## 2023-12-19 PROCEDURE — 82248 BILIRUBIN DIRECT: CPT

## 2023-12-19 PROCEDURE — 83540 ASSAY OF IRON: CPT

## 2023-12-19 PROCEDURE — 99214 OFFICE O/P EST MOD 30 MIN: CPT | Performed by: FAMILY MEDICINE

## 2023-12-19 ASSESSMENT — PAIN SCALES - GENERAL: PAINLEVEL: 0-NO PAIN

## 2023-12-19 ASSESSMENT — PATIENT HEALTH QUESTIONNAIRE - PHQ9
1. LITTLE INTEREST OR PLEASURE IN DOING THINGS: NOT AT ALL
2. FEELING DOWN, DEPRESSED OR HOPELESS: NOT AT ALL
SUM OF ALL RESPONSES TO PHQ9 QUESTIONS 1 AND 2: 0

## 2023-12-20 ENCOUNTER — TELEPHONE (OUTPATIENT)
Dept: PRIMARY CARE | Facility: CLINIC | Age: 38
End: 2023-12-20
Payer: COMMERCIAL

## 2023-12-20 DIAGNOSIS — D50.9 IRON DEFICIENCY ANEMIA, UNSPECIFIED IRON DEFICIENCY ANEMIA TYPE: Primary | ICD-10-CM

## 2023-12-20 DIAGNOSIS — D64.9 ANEMIA, UNSPECIFIED TYPE: ICD-10-CM

## 2023-12-20 PROBLEM — R73.03 PREDIABETES: Chronic | Status: ACTIVE | Noted: 2023-12-20

## 2023-12-20 LAB
ALBUMIN SERPL BCP-MCNC: 3.8 G/DL (ref 3.4–5)
ALP SERPL-CCNC: 68 U/L (ref 33–110)
ALT SERPL W P-5'-P-CCNC: 18 U/L (ref 7–45)
ANION GAP SERPL CALC-SCNC: 12 MMOL/L (ref 10–20)
AST SERPL W P-5'-P-CCNC: 16 U/L (ref 9–39)
BILIRUB DIRECT SERPL-MCNC: 0.1 MG/DL (ref 0–0.3)
BILIRUB SERPL-MCNC: 0.5 MG/DL (ref 0–1.2)
BUN SERPL-MCNC: 19 MG/DL (ref 6–23)
CALCIUM SERPL-MCNC: 9.2 MG/DL (ref 8.6–10.6)
CHLORIDE SERPL-SCNC: 105 MMOL/L (ref 98–107)
CO2 SERPL-SCNC: 24 MMOL/L (ref 21–32)
CREAT SERPL-MCNC: 0.82 MG/DL (ref 0.5–1.05)
ERYTHROCYTE [DISTWIDTH] IN BLOOD BY AUTOMATED COUNT: 12.1 % (ref 11.5–14.5)
FERRITIN SERPL-MCNC: 41 NG/ML (ref 8–150)
FOLATE SERPL-MCNC: >24 NG/ML
GFR SERPL CREATININE-BSD FRML MDRD: >90 ML/MIN/1.73M*2
GLUCOSE SERPL-MCNC: 105 MG/DL (ref 74–99)
HCT VFR BLD AUTO: 36.5 % (ref 36–46)
HGB BLD-MCNC: 11.8 G/DL (ref 12–16)
IRON SERPL-MCNC: 49 UG/DL (ref 35–150)
MCH RBC QN AUTO: 28.1 PG (ref 26–34)
MCHC RBC AUTO-ENTMCNC: 32.3 G/DL (ref 32–36)
MCV RBC AUTO: 87 FL (ref 80–100)
NRBC BLD-RTO: 0 /100 WBCS (ref 0–0)
PLATELET # BLD AUTO: 334 X10*3/UL (ref 150–450)
POTASSIUM SERPL-SCNC: 4 MMOL/L (ref 3.5–5.3)
PROT SERPL-MCNC: 6.9 G/DL (ref 6.4–8.2)
RBC # BLD AUTO: 4.2 X10*6/UL (ref 4–5.2)
SODIUM SERPL-SCNC: 137 MMOL/L (ref 136–145)
TSH SERPL-ACNC: 2.14 MIU/L (ref 0.44–3.98)
VIT B12 SERPL-MCNC: 362 PG/ML (ref 211–911)
WBC # BLD AUTO: 6.7 X10*3/UL (ref 4.4–11.3)

## 2023-12-20 NOTE — TELEPHONE ENCOUNTER
"----- Message from Nichelle Waters MA sent at 12/20/2023  1:22 PM EST -----  Regarding: FW: results  Contact: [phone]  FYI those were added on as routine, they do not come over as \"add on\" so she would need redrawn without different orders  ----- Message -----  From: Milagros Henson  Sent: 12/20/2023  12:39 PM EST  To: Do James[address] [place] [address] Clinical Support Staff  Subject: results                                          Do I have to go get more labs done or can they use what they have from yesterday?    "

## 2023-12-21 ENCOUNTER — TELEPHONE (OUTPATIENT)
Dept: PRIMARY CARE | Facility: CLINIC | Age: 38
End: 2023-12-21
Payer: COMMERCIAL

## 2023-12-21 NOTE — TELEPHONE ENCOUNTER
----- Message from Milagros Brenner CMA sent at 12/21/2023  8:44 AM EST -----  Regarding: FW: results  Contact: [phone]    ----- Message -----  From: Milagros Henson  Sent: 12/21/2023   8:35 AM EST  To: Do Grant [place] [address] Clinical Support Staff  Subject: results                                          It appears my iron level is ok but with the mild anemia before I was taking iron to help. Can you give me some insight on how to help??

## 2023-12-22 ENCOUNTER — TELEPHONE (OUTPATIENT)
Dept: PRIMARY CARE | Facility: CLINIC | Age: 38
End: 2023-12-22
Payer: COMMERCIAL

## 2023-12-22 NOTE — TELEPHONE ENCOUNTER
----- Message from Loni Mayberry CMA sent at 12/21/2023 12:21 PM EST -----  Regarding: FW: results  Contact: [phone]    ----- Message -----  From: Milagros Henson  Sent: 12/21/2023  12:20 PM EST  To: Do Grant [place] [id number] Clinical Support Staff  Subject: results                                          I am currently having an extremely heavy period when the blood was drawn I’m wondering if that is effecting the low result right now

## 2023-12-22 NOTE — TELEPHONE ENCOUNTER
----- Message from Loni Mayberry CMA sent at 12/21/2023 12:21 PM EST -----  Regarding: FW: results  Contact: [phone]    ----- Message -----  From: Milagros Henson  Sent: 12/21/2023  12:20 PM EST  To: Do Grant [place] [address] Clinical Support Staff  Subject: results                                          I am currently having an extremely heavy period when the blood was drawn I’m wondering if that is effecting the low result right now

## 2024-01-17 PROBLEM — D50.9 IRON DEFICIENCY ANEMIA: Chronic | Status: ACTIVE | Noted: 2023-06-16

## 2024-01-17 PROBLEM — D64.9 ANEMIA: Chronic | Status: ACTIVE | Noted: 2024-01-17

## 2024-01-17 NOTE — PROGRESS NOTES
Subjective        Milagros Henson. Is 38 y.o.. female. Here for follow up office visit-       Chief Complaint   Patient presents with    Follow-up    Hypertension    Headache     Behind her eyes since last visit.    Dizziness     When she stands up or gets up out of bed.       HPI:        Follow up for recheck BP    11/7/23 151/101    12/19/23 151/100    BP normal today      Dizzy on and off         12/2023 pt had labs      GYN Dr Farrell- pt has mild anemia- diagnosis menorrhagia               Office Visit on 12/19/2023   Component Date Value Ref Range Status    Thyroid Stimulating Hormone 12/19/2023 2.14  0.44 - 3.98 mIU/L Final    Albumin 12/19/2023 3.8  3.4 - 5.0 g/dL Final    Bilirubin, Total 12/19/2023 0.5  0.0 - 1.2 mg/dL Final    Bilirubin, Direct 12/19/2023 0.1  0.0 - 0.3 mg/dL Final    Alkaline Phosphatase 12/19/2023 68  33 - 110 U/L Final    ALT 12/19/2023 18  7 - 45 U/L Final    Patients treated with Sulfasalazine may generate falsely decreased results for ALT.    AST 12/19/2023 16  9 - 39 U/L Final    Total Protein 12/19/2023 6.9  6.4 - 8.2 g/dL Final    WBC 12/19/2023 6.7  4.4 - 11.3 x10*3/uL Final    nRBC 12/19/2023 0.0  0.0 - 0.0 /100 WBCs Final    RBC 12/19/2023 4.20  4.00 - 5.20 x10*6/uL Final    Hemoglobin 12/19/2023 11.8 (L)  12.0 - 16.0 g/dL Final    Hematocrit 12/19/2023 36.5  36.0 - 46.0 % Final    MCV 12/19/2023 87  80 - 100 fL Final    MCH 12/19/2023 28.1  26.0 - 34.0 pg Final    MCHC 12/19/2023 32.3  32.0 - 36.0 g/dL Final    RDW 12/19/2023 12.1  11.5 - 14.5 % Final    Platelets 12/19/2023 334  150 - 450 x10*3/uL Final    Glucose 12/19/2023 105 (H)  74 - 99 mg/dL Final    Sodium 12/19/2023 137  136 - 145 mmol/L Final    Potassium 12/19/2023 4.0  3.5 - 5.3 mmol/L Final    Chloride 12/19/2023 105  98 - 107 mmol/L Final    Bicarbonate 12/19/2023 24  21 - 32 mmol/L Final    Anion Gap 12/19/2023 12  10 - 20 mmol/L Final    Urea Nitrogen 12/19/2023 19  6 - 23 mg/dL Final    Creatinine  12/19/2023 0.82  0.50 - 1.05 mg/dL Final    eGFR 12/19/2023 >90  >60 mL/min/1.73m*2 Final    Calculations of estimated GFR are performed using the 2021 CKD-EPI Study Refit equation without the race variable for the IDMS-Traceable creatinine methods.  https://jasn.asnjournals.org/content/early/2021/09/22/ASN.6671893164    Calcium 12/19/2023 9.2  8.6 - 10.6 mg/dL Final    Folate, Serum 12/19/2023 >24.0  >5.0 ng/mL Final    Vitamin B12 12/19/2023 362  211 - 911 pg/mL Final    Iron 12/19/2023 49  35 - 150 ug/dL Final    Ferritin 12/19/2023 41  8 - 150 ng/mL Final   Office Visit on 08/24/2023   Component Date Value Ref Range Status    POC Color, Urine 08/24/2023 Yellow  Straw, Yellow, Light Yellow Final    POC Appearance, Urine 08/24/2023 Clear  Clear Final    POC Specific Gravity, Urine 08/24/2023 <=1.005  1.005 - 1.035 Final    POC PH, Urine 08/24/2023 6.5  No Reference Range Established PH Final    POC Protein, Urine 08/24/2023 NEGATIVE  NEGATIVE, 30 (1+) mg/dl Final    POC Glucose, Urine 08/24/2023 NEGATIVE  NEGATIVE mg/dl Final    POC Blood, Urine 08/24/2023 MODERATE (2+) (A)  NEGATIVE Final    POC Ketones, Urine 08/24/2023 NEGATIVE  NEGATIVE mg/dl Final    POC Bilirubin, Urine 08/24/2023 NEGATIVE  NEGATIVE Final    POC Urobilinogen, Urine 08/24/2023 0.2  0.2, 1.0 EU/DL Final    Poc Nitrite, Urine 08/24/2023 NEGATIVE  NEGATIVE Final    POC Leukocytes, Urine 08/24/2023 MODERATE (2+) (A)  NEGATIVE Final    Color, Urine 08/24/2023 YELLOW  STRAW,YELLOW Final    Appearance, Urine 08/24/2023 HAZY  CLEAR Final    Specific Gravity, Urine 08/24/2023 1.005  1.005 - 1.035 Final    pH, Urine 08/24/2023 6.0  5.0 - 8.0 Final    Protein, Urine 08/24/2023 NEGATIVE  NEGATIVE mg/dL Final    Glucose, Urine 08/24/2023 NEGATIVE  NEGATIVE mg/dL Final    Blood, Urine 08/24/2023 MODERATE (2+) (A)  NEGATIVE Final    Ketones, Urine 08/24/2023 NEGATIVE  NEGATIVE mg/dL Final    Bilirubin, Urine 08/24/2023 NEGATIVE  NEGATIVE Final     Urobilinogen, Urine 08/24/2023 <2.0  0.0 - 1.9 mg/dL Final    Nitrite, Urine 08/24/2023 NEGATIVE  NEGATIVE Final    Leukocyte Esterase, Urine 08/24/2023 LARGE (3+) (A)  NEGATIVE Final    WBC, Urine 08/24/2023 56 (A)  0 - 5 /HPF Final    RBC, Urine 08/24/2023 4  0 - 5 /HPF Final    Squamous Epithelial Cells, Urine 08/24/2023 <1  /HPF Final    Bacteria, Urine 08/24/2023 4+ (A)  /HPF Final    Mucus, Urine 08/24/2023 1+  /LPF Final    Urine Culture 08/24/2023 Escherichia coli (A)   Final    >100,000 CFU/ML   Office Visit on 06/20/2023   Component Date Value Ref Range Status    Glucose 06/20/2023 95  74 - 99 mg/dL Final    Cholesterol 06/20/2023 139  0 - 199 mg/dL Final    .      AGE      DESIRABLE   BORDERLINE HIGH   HIGH     0-19 Y     0 - 169       170 - 199     >/= 200    20-24 Y     0 - 189       190 - 224     >/= 225         >24 Y     0 - 199       200 - 239     >/= 240   **All ranges are based on fasting samples. Specific   therapeutic targets will vary based on patient-specific   cardiac risk.  .   Pediatric guidelines reference:Pediatrics 2011, 128(S5).   Adult guidelines reference: NCEP ATPIII Guidelines,     NINFA 2001, 258:2486-97  .   Venipuncture immediately after or during the    administration of Metamizole may lead to falsely   low results. Testing should be performed immediately   prior to Metamizole dosing.    HDL 06/20/2023 57.8  mg/dL Final    .      AGE      VERY LOW   LOW     NORMAL    HIGH       0-19 Y       < 35   < 40     40-45     ----    20-24 Y       ----   < 40       >45     ----      >24 Y       ----   < 40     40-60      >60  .    Cholesterol/HDL Ratio 06/20/2023 2.4   Final    REF VALUES  DESIRABLE  < 3.4  HIGH RISK  > 5.0    LDL 06/20/2023 43  0 - 99 mg/dL Final    .                           NEAR      BORD      AGE      DESIRABLE  OPTIMAL    HIGH     HIGH     VERY HIGH     0-19 Y     0 - 109     ---    110-129   >/= 130     ----    20-24 Y     0 - 119     ---    120-159   >/= 160      ----      >24 Y     0 -  99   100-129  130-159   160-189     >/=190  .    VLDL 06/20/2023 38  0 - 40 mg/dL Final    Triglycerides 06/20/2023 191 (H)  0 - 149 mg/dL Final    .      AGE      DESIRABLE   BORDERLINE HIGH   HIGH     VERY HIGH   0 D-90 D    19 - 174         ----         ----        ----  91 D- 9 Y     0 -  74        75 -  99     >/= 100      ----    10-19 Y     0 -  89        90 - 129     >/= 130      ----    20-24 Y     0 - 114       115 - 149     >/= 150      ----         >24 Y     0 - 149       150 - 199    200- 499    >/= 500  .   Venipuncture immediately after or during the    administration of Metamizole may lead to falsely   low results. Testing should be performed immediately   prior to Metamizole dosing.         REVIEW OF SYSTEMS:        Objective      Vitals:    01/22/24 0633   BP: 120/78   BP Location: Left arm   Patient Position: Sitting   BP Cuff Size: Adult   Pulse: 108   Temp: 37.1 °C (98.7 °F)   TempSrc: Temporal   SpO2: 96%   Weight: 76.8 kg (169 lb 6.4 oz)       PHYSICAL:      Pt is A and O x3, NAD, normal gait, climbs on and off exam table without difficulty   Head- normocephalic and atraumatic,   EYES- conjunctiva- normal   lids- normal  PERRLA, EOMI  EARS/NOSE- TM's normal, nasopharynx- normal and atraumatic  OROPHARYNX- normal  Normal speech, talks easily  NECK- supple, FROM  THYROID- NT, normal size, no nodule noted  LYMPH- no cervical lymph nodes palpated   CV- RRR without murmur  PULM- CTA bilaterally, normal respiratory effort  RESPIRATORY EFFORT- normal , no retractions or nasal flaring   ABD- normoactive BS's , soft , NT, no hepatosplenomegaly palpated  EXT- no edema,NT  SKIN- no abnormal skin lesions noted  NEURO- no focal deficits  CN 2-12 intact  Finger-to-nose normal exam  Heel/toe rise normal   PSYCH- pleasant, normal judgement and insight        BP Readings from Last 3 Encounters:   01/22/24 120/78   12/19/23 (!) 151/100   11/07/23 (!) 151/101             Wt Readings from  Last 3 Encounters:   01/22/24 76.8 kg (169 lb 6.4 oz)   12/19/23 78.5 kg (173 lb)   11/07/23 76.9 kg (169 lb 9.6 oz)           BMI Readings from Last 3 Encounters:   01/22/24 25.76 kg/m²   12/19/23 26.30 kg/m²   11/07/23 25.79 kg/m²               The number and complexity of problems addressed is considered moderate.  The amount and/or complexity of data reviewed and analyzed is considered moderate. The risk of complications and/or morbidity/mortality of patient is considered moderate. Overall, this patient encounter is considered a moderate risk visit.          Assessment/Plan      Problem List Items Addressed This Visit          Active Problems    Anemia (Chronic)    Dizziness (Chronic)    Relevant Orders    Referral to Neurology    Elevated blood-pressure reading without diagnosis of hypertension - Primary    Iron deficiency anemia (Chronic)    Menorrhagia    Nonintractable headache (Chronic)       Orders Placed This Encounter   Procedures    Referral to Neurology         Refer Neurology         Follow up with Dr Farrell           Follow up in 3-4 months - recheck BP

## 2024-01-22 ENCOUNTER — OFFICE VISIT (OUTPATIENT)
Dept: PRIMARY CARE | Facility: CLINIC | Age: 39
End: 2024-01-22
Payer: COMMERCIAL

## 2024-01-22 VITALS
DIASTOLIC BLOOD PRESSURE: 78 MMHG | HEART RATE: 108 BPM | OXYGEN SATURATION: 96 % | SYSTOLIC BLOOD PRESSURE: 120 MMHG | TEMPERATURE: 98.7 F | BODY MASS INDEX: 25.76 KG/M2 | WEIGHT: 169.4 LBS

## 2024-01-22 DIAGNOSIS — R03.0 ELEVATED BLOOD-PRESSURE READING WITHOUT DIAGNOSIS OF HYPERTENSION: Primary | Chronic | ICD-10-CM

## 2024-01-22 DIAGNOSIS — N92.4 EXCESSIVE BLEEDING IN PREMENOPAUSAL PERIOD: Chronic | ICD-10-CM

## 2024-01-22 DIAGNOSIS — R42 DIZZINESS: Chronic | ICD-10-CM

## 2024-01-22 DIAGNOSIS — D64.9 ANEMIA, UNSPECIFIED TYPE: Chronic | ICD-10-CM

## 2024-01-22 DIAGNOSIS — R51.9 NONINTRACTABLE HEADACHE, UNSPECIFIED CHRONICITY PATTERN, UNSPECIFIED HEADACHE TYPE: Chronic | ICD-10-CM

## 2024-01-22 DIAGNOSIS — D50.9 IRON DEFICIENCY ANEMIA, UNSPECIFIED IRON DEFICIENCY ANEMIA TYPE: Chronic | ICD-10-CM

## 2024-01-22 PROCEDURE — 99214 OFFICE O/P EST MOD 30 MIN: CPT | Performed by: FAMILY MEDICINE

## 2024-01-22 PROCEDURE — 1036F TOBACCO NON-USER: CPT | Performed by: FAMILY MEDICINE

## 2024-01-23 ENCOUNTER — TELEPHONE (OUTPATIENT)
Dept: PRIMARY CARE | Facility: CLINIC | Age: 39
End: 2024-01-23
Payer: COMMERCIAL

## 2024-01-23 NOTE — TELEPHONE ENCOUNTER
spoke with nurse and provided a compassionate presence and prayer support for patient.     Pt informed and verbalized understanding.

## 2024-01-23 NOTE — TELEPHONE ENCOUNTER
Called and spoke with patient she wants to know how long she should wait and evaluate her symptoms before considering rheumatology.

## 2024-01-23 NOTE — TELEPHONE ENCOUNTER
I spoke with the patient, she states when she was here yesterday she had mentioned the red and swollen joints in her fingers. She wants to be sure she is not missing something?    She made an appointment with neurology next week on Thursday.    Additionally, she knows she has had testing for autoimmune and everything came back within normal limits, however her mom has a history of nursing and medicine. She wonders if she would benefit with a second opinion from Rheumatology for dx of Lupus?

## 2024-01-23 NOTE — TELEPHONE ENCOUNTER
Spoke with the patient, explained Keren would be best to speak with to schedule an appointment. She was transferred to the [facility] for scheduling.

## 2024-01-23 NOTE — TELEPHONE ENCOUNTER
----- Message from Corie Ayala CMA sent at 1/23/2024  8:14 AM EST -----  Regarding: FW: Follow up from yesterday   Contact: [phone]    ----- Message -----  From: Milagros Henson  Sent: 1/23/2024   8:02 AM EST  To: [facility] [address] [place] [address] Clinical Support Staff  Subject: Follow up from yesterday                         I also need to know if they are calling today to make the appointment for the neurologist. I cannot function for my day to day routine so I really need to get in there ASAP

## 2024-01-23 NOTE — TELEPHONE ENCOUNTER
----- Message from Corie Ayala CMA sent at 1/23/2024  7:12 AM EST -----  Regarding: FW: Follow up from yesterday   Contact: [phone]    ----- Message -----  From: Milagros Henson  Sent: 1/23/2024   6:43 AM EST  To: [facility] Clinical Support Staff  Subject: Follow up from yesterday                         I’m not sure if this is related to all that I talked to you about, I know I mentioned my fingers being really sore but as the day went on all of my joints ache like I’ve never experienced before. And idea what could be causing that?

## 2024-01-30 ENCOUNTER — TELEPHONE (OUTPATIENT)
Dept: PRIMARY CARE | Facility: CLINIC | Age: 39
End: 2024-01-30
Payer: COMMERCIAL

## 2024-01-30 DIAGNOSIS — M25.50 ARTHRALGIA OF MULTIPLE JOINTS: Primary | ICD-10-CM

## 2024-01-30 NOTE — TELEPHONE ENCOUNTER
Called and spoke with pt, she was informed and verbalized understanding. Pt was transferred to [facility] to get this scheduled.

## 2024-02-01 ENCOUNTER — OFFICE VISIT (OUTPATIENT)
Dept: NEUROLOGY | Facility: CLINIC | Age: 39
End: 2024-02-01
Payer: COMMERCIAL

## 2024-02-01 VITALS
HEART RATE: 91 BPM | DIASTOLIC BLOOD PRESSURE: 103 MMHG | HEIGHT: 68 IN | SYSTOLIC BLOOD PRESSURE: 158 MMHG | WEIGHT: 173.5 LBS | BODY MASS INDEX: 26.3 KG/M2

## 2024-02-01 DIAGNOSIS — R42 DIZZINESS: Primary | Chronic | ICD-10-CM

## 2024-02-01 PROCEDURE — 99204 OFFICE O/P NEW MOD 45 MIN: CPT | Performed by: PSYCHIATRY & NEUROLOGY

## 2024-02-01 PROCEDURE — 1036F TOBACCO NON-USER: CPT | Performed by: PSYCHIATRY & NEUROLOGY

## 2024-02-01 ASSESSMENT — PATIENT HEALTH QUESTIONNAIRE - PHQ9
1. LITTLE INTEREST OR PLEASURE IN DOING THINGS: NOT AT ALL
SUM OF ALL RESPONSES TO PHQ9 QUESTIONS 1 AND 2: 0
2. FEELING DOWN, DEPRESSED OR HOPELESS: NOT AT ALL

## 2024-02-01 ASSESSMENT — ENCOUNTER SYMPTOMS
DIZZINESS: 1
HEADACHES: 1

## 2024-02-01 ASSESSMENT — PAIN SCALES - GENERAL: PAINLEVEL: 4

## 2024-02-01 NOTE — PATIENT INSTRUCTIONS
Recommend that we get an MRI brain w/wo contrast for further evaluation.  I want you to try magnesium oxide or gluconate 400 mg daily and riboflavin B2 400 mg daily.  The B2 can give energy so take it in the morning and magnesium can affect the bowel movement, the mag gluconate is less likely to do that than the mag oxide.  Give these supplements at least 3 weeks and if no improvement in headaches will try a prescription.  Looking for 50% decreases in headache frequency.

## 2024-02-01 NOTE — PROGRESS NOTES
Subjective     Milagros Henson is a right handed  38 y.o. year old female who presents with Dizziness (NPV) and Headache (NPV- currently has a HA). .    Visit type: new patient visit    HPI     She reports that she feels the dizziness most often in the morning when she first stands up and then feels hot like she is going to pass out.  She will have to take some deep breaths and sit  down.  Then she will be able to get up for a bit and then have to sit down.  She has had a few episodes in the shower when she gets overheated. She had some episodes like this through her entire adult life however they are increasing in frequency.  She takes a 40 oz bottle of water a day and she drinks one at work. At home she drinks water during the night.  She has tried to cut out caffeine in the last year.  She tries not to drink it daily.  It was affecting the headaches.  She socially drinks socially on the weekends about 6 drinks per weekend day.  Its a lightheadedness.     Headaches are usually around the eyes and temples.  The pain is a pressure.  She has had some that affect her eyes with blurry vision. Pressure gets worse if she leans over.  She reports phonophobia.  She does get nausea with the severe dizziness in the morning and she was losing her appetite.  She did not vomit.  There is no spinning sensation with the dizziness.  She does not notice any nausea with dizziness.   She takes Excedrin migraine which is typically taking every other day.  She takes half a dose.  She has been having daily headaches for a few weeks.  The headaches are typically not present in the morning and they do come on later in the day.  She does wear contact.      She denies any significant neck pain. She does see a chiropractor.      She reports joint pain and chronic fatigue.  She does not snore at night. She goes to bed by 9/10 pm and up at 6 am.  She typically sleeps through the night. She does not fall asleep during the day.      Review of  Systems  All other systems have been reviewed and are negative for complaint.     Past Medical History:   Diagnosis Date    Allergic Seasonal    Anemia During pregnancy    Decreased white blood cell count, unspecified     Leukopenia    Disorder of the skin and subcutaneous tissue, unspecified 2019    Skin lesions, generalized    Encounter for screening for malignant neoplasm of colon     Encounter for screening colonoscopy    GERD (gastroesophageal reflux disease)     Other conditions influencing health status 2016    History of cough    Other specified disorders of nose and nasal sinuses 2019    Posterior rhinorrhea    Personal history of diseases of the blood and blood-forming organs and certain disorders involving the immune mechanism     History of anemia    Personal history of other diseases of the digestive system 2020    History of bloody stools    Personal history of other diseases of the respiratory system 2016    History of sore throat    Personal history of other specified conditions 2015    History of urinary frequency    Personal history of other specified conditions 2022    History of dysuria    Personal history of other specified conditions 2019    History of fatigue    Urinary tract infection, site not specified 2015    Acute UTI (urinary tract infection)    Urinary tract infection, site not specified 2022    Acute UTI     Family History   Problem Relation Name Age of Onset    Hypertension Mother Do oprzadesendy     Diabetes Mother Do oprzadesendy     Migraines Mother Do oprzadesendy     Hypertension Father Williams oprzadesendy     Stroke Father Williams poolzadesendy     Breast cancer Maternal Grandmother Bruna bowen     Diabetes Maternal Grandfather Blair bowen     Kidney disease Maternal Grandfather Blair bowen      Past Surgical History:   Procedure Laterality Date     SECTION, LOW TRANSVERSE      OTHER SURGICAL HISTORY  2021     Esophagogastroduodenoscopy    OTHER SURGICAL HISTORY  2019     section      Social History     Tobacco Use    Smoking status: Never     Passive exposure: Never    Smokeless tobacco: Never   Substance Use Topics    Alcohol use: Yes     Comment: 15 drinks over 7 days          Objective     Neurological Exam    Physical Exam    On general examination, the patient is well appearing and well groomed. Heart is regular, rate and rhythm. Lungs are clear to ausculation bilaterally. There is no peripheral edema. Normal pedal pulses bilaterally. No carotid bruits.   On neurologic examination, the mental status is unremarkable to informal testing. The history is related in quite good detail with no obvious deficit of attention, memory or language. Fund of knowledge is adequate. Orientation is intact to person, place and time. Spontaneous speech is fluent with no paraphasic or aphasic errors. On cranial nerve exam, the pupils are equal, round and reactive to light. Extraocular movements are full, without nystagmus. She reports no double vision on sustained upgaze or lateral gaze. Visual fields are full to confrontation. The fundi are benign with normal sharp disc margins. There is no bulbofacial weakness or ptosis. There is no ptosis with sustained upgaze. The tongue is midline with no wasting or fasciculations. The palate elevates symmetrically. Facial sensation is intact to light touch and pinprick bilaterally. Hearing is intact to finger rub bilaterally. Shoulder shrug is 5/5 bilaterally.  Neck flexion and extension have full strength. Motor examination reveals normal tone and bulk in the upper and lower extremities bilaterally. There are no fasciculations. There is full strength in the proximal and distal muscles of the upper and lower extremities bilaterally. There is no muscle weakness or fatiguing of muscle strength with repeated testing of muscle strength. There is no scapular winging. Deep tendon reflexes  are 2/4 and symmetric throughout the upper and lower extremities bilaterally, including the ankle jerks. Plantar responses are flexor bilaterally. Fine finger movements and rapid alternating movements are done well in both hands. There is no tremor. On coordination testing, finger-nose-finger and heel-knee-shin testing are done well bilaterally. Sensory examination reveals normal light touch, pinprick, cold, vibratory and joint position sensation in the distal upper and lower extremities bilaterally. Gait is normal, including tandem gait, and heel and toe walking. There is no Romberg sign. On functional testing, the patient can arise from a chair with no difficulty.       Assessment/Plan   Ms. Henson is a 38 year old woman presenting to then neurology clinic today for initial evaluation of headache sand dizziness. Dizziness is mostly orthostatic and in the morning when wakes up.  Headaches are most consistent with tension type headache.  Neurologic exam is normal. Will get MRI brain to further evaluate dizziness. If normal could consider autonomic testing.    For headaches will try magnesium and riboflavin. If no improvement consider starting propranolol.    Isabel Sandy, DO

## 2024-02-21 ENCOUNTER — HOSPITAL ENCOUNTER (OUTPATIENT)
Dept: RADIOLOGY | Facility: CLINIC | Age: 39
Discharge: HOME | End: 2024-02-21
Payer: COMMERCIAL

## 2024-02-21 DIAGNOSIS — R42 DIZZINESS: Chronic | ICD-10-CM

## 2024-02-21 PROCEDURE — 2550000001 HC RX 255 CONTRASTS: Performed by: PSYCHIATRY & NEUROLOGY

## 2024-02-21 PROCEDURE — 70553 MRI BRAIN STEM W/O & W/DYE: CPT | Performed by: RADIOLOGY

## 2024-02-21 PROCEDURE — A9575 INJ GADOTERATE MEGLUMI 0.1ML: HCPCS | Performed by: PSYCHIATRY & NEUROLOGY

## 2024-02-21 PROCEDURE — 70553 MRI BRAIN STEM W/O & W/DYE: CPT

## 2024-02-21 RX ORDER — GADOTERATE MEGLUMINE 376.9 MG/ML
15 INJECTION INTRAVENOUS
Status: COMPLETED | OUTPATIENT
Start: 2024-02-21 | End: 2024-02-21

## 2024-02-21 RX ADMIN — GADOTERATE MEGLUMINE 15 ML: 376.9 INJECTION INTRAVENOUS at 17:22

## 2024-02-22 ENCOUNTER — TELEPHONE (OUTPATIENT)
Dept: NEUROLOGY | Facility: CLINIC | Age: 39
End: 2024-02-22
Payer: COMMERCIAL

## 2024-02-22 NOTE — TELEPHONE ENCOUNTER
----- Message from Isabel Sandy DO sent at 2/22/2024 10:16 AM EST -----  Please let the patient know that her brain MRI looks normal however she has evidence of sinusitis on the left side.  Does she have any pressure over the left face or nasal drainage.

## 2024-02-23 ENCOUNTER — TELEPHONE (OUTPATIENT)
Dept: PRIMARY CARE | Facility: CLINIC | Age: 39
End: 2024-02-23
Payer: COMMERCIAL

## 2024-02-23 DIAGNOSIS — R93.89 ABNORMAL MRI: Primary | Chronic | ICD-10-CM

## 2024-02-23 NOTE — TELEPHONE ENCOUNTER
----- Message from Nichelle Waters MA sent at 2/22/2024  1:08 PM EST -----  Regarding: FW: Mri results  Contact: [phone]    ----- Message -----  From: Milagros Henson  Sent: 2/22/2024   1:06 PM EST  To: [facility] [address] [place] [address] Clinical Support Staff  Subject: Mri results                                      My neurologist wanted me to follow up with you regarding my MRI results    This is what she said  I would just follow up with your PCP then to see if she thinks anything needs to be done about the sinus findings.     Let me know if you think it looks like another sinus infection or if we need to do anything

## 2024-02-23 NOTE — TELEPHONE ENCOUNTER
Pt states that she has a head cold too when she had this MRI done and would like you to look at her MRI because there were multiple medical terms used on the results and she doesn't really understand what is going on. If this is just happening because she has a head cold she would like to have an antibiotic and not have to see a specialist just because she is battling sickness. But if you evaluate the MRI and think this is a chronic thing that will never go away then she will see ENT. Pt states she would like this explained further and better to her before seeing a specialist if she doesn't need to.

## 2024-03-19 ENCOUNTER — OFFICE VISIT (OUTPATIENT)
Dept: RHEUMATOLOGY | Facility: CLINIC | Age: 39
End: 2024-03-19
Payer: COMMERCIAL

## 2024-03-19 VITALS
HEART RATE: 87 BPM | DIASTOLIC BLOOD PRESSURE: 98 MMHG | SYSTOLIC BLOOD PRESSURE: 162 MMHG | WEIGHT: 170 LBS | BODY MASS INDEX: 25.76 KG/M2 | HEIGHT: 68 IN

## 2024-03-19 DIAGNOSIS — M25.50 ARTHRALGIA OF MULTIPLE JOINTS: Primary | ICD-10-CM

## 2024-03-19 DIAGNOSIS — M54.50 LOW BACK PAIN, UNSPECIFIED BACK PAIN LATERALITY, UNSPECIFIED CHRONICITY, UNSPECIFIED WHETHER SCIATICA PRESENT: ICD-10-CM

## 2024-03-19 PROCEDURE — 99204 OFFICE O/P NEW MOD 45 MIN: CPT | Performed by: INTERNAL MEDICINE

## 2024-03-19 PROCEDURE — 1036F TOBACCO NON-USER: CPT | Performed by: INTERNAL MEDICINE

## 2024-03-19 ASSESSMENT — PAIN SCALES - GENERAL: PAINLEVEL: 3

## 2024-03-19 NOTE — PROGRESS NOTES
Subjective   Patient ID: Milagros Henson is a 39 y.o. female who presents for New Patient Visit (New patient visit. Pain in both knees and hips. ).    HPI  38 yo female with fatigue and joint pain  She feels tired since her teenager years  In 01/24, she has generalized body pain, she had hand joint , knee and hip pain  She had also some rash in 01/24  She denies any psoriasis, dry eyes, dry mouth, Raynaud, oral ulcer, LBP  She has mild scoliosis, and LBP  Today, she reports knee, hip pain  She has only a few min AM stiffness  She denies smoking, occasional alcohol  Her mother has SLE  06/22:  ANGI, RF, CCP neg  ESR, CRP NL    ROS  Joint pain in hands: negative   Joint swelling: negative  Morning stiffness and duration: pos, a few min   strength: normal  Oral ulcer: negative  Genital ulcer: negative  Raynaud phenomenon: negative  Chest pain/dyspnea: negative  Low back pain: negative  Visual problem: negative  Dry eyes/dry mouth: negative  Skin rash/scaling/psoriasis: negative       Objective     PEXAM  VS reviewed, WNL  General: Alert, no distress   HEENT: Normocephalic/atraumatic, No alopecia. PERRLA. Sclera white, conjunctiva pink, no malar rash. no oral or nasal ulcer. Oral cavity pink and moist, no erythema or exudate, dentition good.   Neck: supple  Respiratory: CTA B, no adventitious breath sounds  Cardiac: RRR, no murmurs, carotid, or bruits  Abdominal: symmetrical, soft, non-tender, non-distended, normoactive BSx4 quadrants, no CVA tenderness or suprapubic tenderness  MSK: Joints of upper and lower extremities were assessed for synovitis and ROM.    Today she has no evidence of synovitis in the joints of her hands or wrists, tender joint count 0, swollen joint count 0   Extremities: no clubbing, no cyanosis, no edema  Skin: Skin warm and moist.   Neuro: non-focal, Strength 5/5 throughout. Normal gait. No cerebellar pathologic exam     Assessment/Plan   38 yo female with joint pain and fatigue  She has had  a polyarticular joint pain episode in 01/24, it lasts a few eeks.  Today, she reports knee and hip pain, and mild LBP  PExam did not show any synovitis.  Previous tests showed neg ANGI, RF and CCP in 2022.  I do not think any inflammatory arthritis.  -will repeat her ANGI, RF, CCP, ESR, CRP

## 2024-03-29 ENCOUNTER — HOSPITAL ENCOUNTER (OUTPATIENT)
Dept: RADIOLOGY | Facility: CLINIC | Age: 39
Discharge: HOME | End: 2024-03-29
Payer: COMMERCIAL

## 2024-03-29 ENCOUNTER — LAB (OUTPATIENT)
Dept: LAB | Facility: LAB | Age: 39
End: 2024-03-29
Payer: COMMERCIAL

## 2024-03-29 DIAGNOSIS — M54.50 LOW BACK PAIN, UNSPECIFIED BACK PAIN LATERALITY, UNSPECIFIED CHRONICITY, UNSPECIFIED WHETHER SCIATICA PRESENT: ICD-10-CM

## 2024-03-29 DIAGNOSIS — M25.50 ARTHRALGIA OF MULTIPLE JOINTS: ICD-10-CM

## 2024-03-29 PROCEDURE — 86140 C-REACTIVE PROTEIN: CPT

## 2024-03-29 PROCEDURE — 86038 ANTINUCLEAR ANTIBODIES: CPT

## 2024-03-29 PROCEDURE — 72170 X-RAY EXAM OF PELVIS: CPT

## 2024-03-29 PROCEDURE — 85652 RBC SED RATE AUTOMATED: CPT

## 2024-03-29 PROCEDURE — 36415 COLL VENOUS BLD VENIPUNCTURE: CPT

## 2024-03-29 PROCEDURE — 86431 RHEUMATOID FACTOR QUANT: CPT

## 2024-03-29 PROCEDURE — 86200 CCP ANTIBODY: CPT

## 2024-03-29 PROCEDURE — 72170 X-RAY EXAM OF PELVIS: CPT | Performed by: RADIOLOGY

## 2024-03-30 LAB
CCP IGG SERPL-ACNC: <1 U/ML
CRP SERPL-MCNC: 0.96 MG/DL
ERYTHROCYTE [SEDIMENTATION RATE] IN BLOOD BY WESTERGREN METHOD: 5 MM/H (ref 0–20)
RHEUMATOID FACT SER NEPH-ACNC: <10 IU/ML (ref 0–15)

## 2024-04-01 ENCOUNTER — OFFICE (OUTPATIENT)
Dept: URBAN - METROPOLITAN AREA CLINIC 26 | Facility: CLINIC | Age: 39
End: 2024-04-01

## 2024-04-01 VITALS
HEIGHT: 68 IN | WEIGHT: 171 LBS | DIASTOLIC BLOOD PRESSURE: 119 MMHG | HEART RATE: 85 BPM | SYSTOLIC BLOOD PRESSURE: 168 MMHG | TEMPERATURE: 98.2 F

## 2024-04-01 DIAGNOSIS — K58.9 IRRITABLE BOWEL SYNDROME WITHOUT DIARRHEA: ICD-10-CM

## 2024-04-01 DIAGNOSIS — R03.0 ELEVATED BLOOD-PRESSURE READING, WITHOUT DIAGNOSIS OF HYPERT: ICD-10-CM

## 2024-04-01 DIAGNOSIS — K21.9 GASTRO-ESOPHAGEAL REFLUX DISEASE WITHOUT ESOPHAGITIS: ICD-10-CM

## 2024-04-01 LAB — ANA SER QL HEP2 SUBST: NEGATIVE

## 2024-04-01 PROCEDURE — 99214 OFFICE O/P EST MOD 30 MIN: CPT | Performed by: CLINICAL NURSE SPECIALIST

## 2024-04-01 RX ORDER — OMEPRAZOLE 20 MG/1
CAPSULE, DELAYED RELEASE ORAL
Qty: 90 | Refills: 3 | Status: ACTIVE
Start: 2024-03-19

## 2024-04-02 DIAGNOSIS — R42 DIZZINESS: Primary | ICD-10-CM

## 2024-04-19 PROBLEM — M54.50 LOW BACK PAIN: Status: ACTIVE | Noted: 2024-04-19

## 2024-04-20 NOTE — PROGRESS NOTES
Subjective        Milagros Henson. Is 39 y.o.. female. Here for follow up office visit-       Chief Complaint   Patient presents with    Follow-up    Hypertension       HPI:    When did pt start using the Annovera Ring? 05/2023          Follow up for elevated BP-    and follow up MRI from Neurologiat- showing inflammatory changes left frontal sinus   11/2023= 151/101       1/22/24=120/78-   normal   12/19/23 151/100  3/19/73=672/98    Not currently on Medication for BP      BMI 25     Pt is doing well today      BP does not coincide with starting the Annovera- did discuss may be contributing to elevated BP- may need to consider different contraception per GYN- Dr Farrell         Had labs 12/2023      Other medical specialists are involved in patient's care.    Any recent notes that were available were reviewed.    All conditions are monitored, evaluated and assessed regularly.    Patient is compliant with current treatment regimen/medications.    Specialists involved include:      Dr Quevedo- GI  PN 4/2/24    Dr Chapa- Rheum- polyarticular arthralgia - PN 3/19/24      Dr Sandy- PN 2/1/24      Dr Farrell- GYN      Discussed starting medication for BP- will choose Inderal - may also help for headaches -   Neuro- Dr Sandy        Lab on 03/29/2024   Component Date Value Ref Range Status    Sedimentation Rate 03/29/2024 5  0 - 20 mm/h Final    C-Reactive Protein 03/29/2024 0.96  <1.00 mg/dL Final    ANGI 03/29/2024 Negative  Negative Final    The Antinuclear Antibody (ANGI) test was performed using  indirect immunofluorescence assay with HEp-2 cells slide.    Rheumatoid Factor 03/29/2024 <10  0 - 15 IU/mL Final    Citrulline Antibody, IgG 03/29/2024 <1  <3 U/mL Final    NEGATIVE < 3 U/ML  POSITIVE >=3 U/ML   Office Visit on 12/19/2023   Component Date Value Ref Range Status    Thyroid Stimulating Hormone 12/19/2023 2.14  0.44 - 3.98 mIU/L Final    Albumin 12/19/2023 3.8  3.4 - 5.0 g/dL Final    Bilirubin, Total  12/19/2023 0.5  0.0 - 1.2 mg/dL Final    Bilirubin, Direct 12/19/2023 0.1  0.0 - 0.3 mg/dL Final    Alkaline Phosphatase 12/19/2023 68  33 - 110 U/L Final    ALT 12/19/2023 18  7 - 45 U/L Final    Patients treated with Sulfasalazine may generate falsely decreased results for ALT.    AST 12/19/2023 16  9 - 39 U/L Final    Total Protein 12/19/2023 6.9  6.4 - 8.2 g/dL Final    WBC 12/19/2023 6.7  4.4 - 11.3 x10*3/uL Final    nRBC 12/19/2023 0.0  0.0 - 0.0 /100 WBCs Final    RBC 12/19/2023 4.20  4.00 - 5.20 x10*6/uL Final    Hemoglobin 12/19/2023 11.8 (L)  12.0 - 16.0 g/dL Final    Hematocrit 12/19/2023 36.5  36.0 - 46.0 % Final    MCV 12/19/2023 87  80 - 100 fL Final    MCH 12/19/2023 28.1  26.0 - 34.0 pg Final    MCHC 12/19/2023 32.3  32.0 - 36.0 g/dL Final    RDW 12/19/2023 12.1  11.5 - 14.5 % Final    Platelets 12/19/2023 334  150 - 450 x10*3/uL Final    Glucose 12/19/2023 105 (H)  74 - 99 mg/dL Final    Sodium 12/19/2023 137  136 - 145 mmol/L Final    Potassium 12/19/2023 4.0  3.5 - 5.3 mmol/L Final    Chloride 12/19/2023 105  98 - 107 mmol/L Final    Bicarbonate 12/19/2023 24  21 - 32 mmol/L Final    Anion Gap 12/19/2023 12  10 - 20 mmol/L Final    Urea Nitrogen 12/19/2023 19  6 - 23 mg/dL Final    Creatinine 12/19/2023 0.82  0.50 - 1.05 mg/dL Final    eGFR 12/19/2023 >90  >60 mL/min/1.73m*2 Final    Calculations of estimated GFR are performed using the 2021 CKD-EPI Study Refit equation without the race variable for the IDMS-Traceable creatinine methods.  https://jasn.asnjournals.org/content/early/2021/09/22/ASN.4406498950    Calcium 12/19/2023 9.2  8.6 - 10.6 mg/dL Final    Folate, Serum 12/19/2023 >24.0  >5.0 ng/mL Final    Vitamin B12 12/19/2023 362  211 - 911 pg/mL Final    Iron 12/19/2023 49  35 - 150 ug/dL Final    Ferritin 12/19/2023 41  8 - 150 ng/mL Final   Office Visit on 08/24/2023   Component Date Value Ref Range Status    POC Color, Urine 08/24/2023 Yellow  Straw, Yellow, Light Yellow Final    POC  Appearance, Urine 08/24/2023 Clear  Clear Final    POC Specific Gravity, Urine 08/24/2023 <=1.005  1.005 - 1.035 Final    POC PH, Urine 08/24/2023 6.5  No Reference Range Established PH Final    POC Protein, Urine 08/24/2023 NEGATIVE  NEGATIVE, 30 (1+) mg/dl Final    POC Glucose, Urine 08/24/2023 NEGATIVE  NEGATIVE mg/dl Final    POC Blood, Urine 08/24/2023 MODERATE (2+) (A)  NEGATIVE Final    POC Ketones, Urine 08/24/2023 NEGATIVE  NEGATIVE mg/dl Final    POC Bilirubin, Urine 08/24/2023 NEGATIVE  NEGATIVE Final    POC Urobilinogen, Urine 08/24/2023 0.2  0.2, 1.0 EU/DL Final    Poc Nitrite, Urine 08/24/2023 NEGATIVE  NEGATIVE Final    POC Leukocytes, Urine 08/24/2023 MODERATE (2+) (A)  NEGATIVE Final    Color, Urine 08/24/2023 YELLOW  STRAW,YELLOW Final    Appearance, Urine 08/24/2023 HAZY  CLEAR Final    Specific Gravity, Urine 08/24/2023 1.005  1.005 - 1.035 Final    pH, Urine 08/24/2023 6.0  5.0 - 8.0 Final    Protein, Urine 08/24/2023 NEGATIVE  NEGATIVE mg/dL Final    Glucose, Urine 08/24/2023 NEGATIVE  NEGATIVE mg/dL Final    Blood, Urine 08/24/2023 MODERATE (2+) (A)  NEGATIVE Final    Ketones, Urine 08/24/2023 NEGATIVE  NEGATIVE mg/dL Final    Bilirubin, Urine 08/24/2023 NEGATIVE  NEGATIVE Final    Urobilinogen, Urine 08/24/2023 <2.0  0.0 - 1.9 mg/dL Final    Nitrite, Urine 08/24/2023 NEGATIVE  NEGATIVE Final    Leukocyte Esterase, Urine 08/24/2023 LARGE (3+) (A)  NEGATIVE Final    WBC, Urine 08/24/2023 56 (A)  0 - 5 /HPF Final    RBC, Urine 08/24/2023 4  0 - 5 /HPF Final    Squamous Epithelial Cells, Urine 08/24/2023 <1  /HPF Final    Bacteria, Urine 08/24/2023 4+ (A)  /HPF Final    Mucus, Urine 08/24/2023 1+  /LPF Final    Urine Culture 08/24/2023 Escherichia coli (A)   Final    >100,000 CFU/ML   Office Visit on 06/20/2023   Component Date Value Ref Range Status    Glucose 06/20/2023 95  74 - 99 mg/dL Final    Cholesterol 06/20/2023 139  0 - 199 mg/dL Final    .      AGE      DESIRABLE   BORDERLINE HIGH    HIGH     0-19 Y     0 - 169       170 - 199     >/= 200    20-24 Y     0 - 189       190 - 224     >/= 225         >24 Y     0 - 199       200 - 239     >/= 240   **All ranges are based on fasting samples. Specific   therapeutic targets will vary based on patient-specific   cardiac risk.  .   Pediatric guidelines reference:Pediatrics 2011, 128(S5).   Adult guidelines reference: NCEP ATPIII Guidelines,     NINFA 2001, 258:6726-97  .   Venipuncture immediately after or during the    administration of Metamizole may lead to falsely   low results. Testing should be performed immediately   prior to Metamizole dosing.    HDL 06/20/2023 57.8  mg/dL Final    .      AGE      VERY LOW   LOW     NORMAL    HIGH       0-19 Y       < 35   < 40     40-45     ----    20-24 Y       ----   < 40       >45     ----      >24 Y       ----   < 40     40-60      >60  .    Cholesterol/HDL Ratio 06/20/2023 2.4   Final    REF VALUES  DESIRABLE  < 3.4  HIGH RISK  > 5.0    LDL 06/20/2023 43  0 - 99 mg/dL Final    .                           NEAR      BORD      AGE      DESIRABLE  OPTIMAL    HIGH     HIGH     VERY HIGH     0-19 Y     0 - 109     ---    110-129   >/= 130     ----    20-24 Y     0 - 119     ---    120-159   >/= 160     ----      >24 Y     0 -  99   100-129  130-159   160-189     >/=190  .    VLDL 06/20/2023 38  0 - 40 mg/dL Final    Triglycerides 06/20/2023 191 (H)  0 - 149 mg/dL Final    .      AGE      DESIRABLE   BORDERLINE HIGH   HIGH     VERY HIGH   0 D-90 D    19 - 174         ----         ----        ----  91 D- 9 Y     0 -  74        75 -  99     >/= 100      ----    10-19 Y     0 -  89        90 - 129     >/= 130      ----    20-24 Y     0 - 114       115 - 149     >/= 150      ----         >24 Y     0 - 149       150 - 199    200- 499    >/= 500  .   Venipuncture immediately after or during the    administration of Metamizole may lead to falsely   low results. Testing should be performed immediately   prior to Metamizole  dosing.         REVIEW OF SYSTEMS:        Objective      Vitals:    04/22/24 0643   BP: (!) 160/103   BP Location: Left arm   Patient Position: Sitting   BP Cuff Size: Adult   Pulse: 96   Resp: 16   Temp: 37.2 °C (98.9 °F)   SpO2: 100%   Weight: 78.4 kg (172 lb 14.4 oz)                       PHYSICAL:      Patient is alert and oriented x 3 , NAD  HEAD- normocephalic and atraumatic   EYES-conjunctiva-normal, lids - normal  EARS/NOSE- normal external exam   NECK-supple,FROM  CV- RRR without murmur  PULM- CTA bilaterally, normal respiratory effort  RESPIRATORY EFFORT- normal , no retractions or nasal flaring   ABD- normoactive BS's   EXT- no edema,NT  SKIN- no abnormal skin lesions noted  NEURO- no focal deficits  PSYCH- pleasant, normal judgement and insight      BP Readings from Last 3 Encounters:   04/22/24 (!) 160/103   03/19/24 (!) 162/98   02/01/24 (!) 158/103             Wt Readings from Last 3 Encounters:   04/22/24 78.4 kg (172 lb 14.4 oz)   03/19/24 77.1 kg (170 lb)   02/21/24 74.8 kg (165 lb)           BMI Readings from Last 3 Encounters:   04/22/24 26.29 kg/m²   03/19/24 25.85 kg/m²   02/21/24 24.37 kg/m²               The number and complexity of problems addressed is considered moderate.  The amount and/or complexity of data reviewed and analyzed is considered moderate. The risk of complications and/or morbidity/mortality of patient is considered moderate. Overall, this patient encounter is considered a moderate risk visit.          Assessment/Plan      Problem List Items Addressed This Visit          Active Problems    Benign essential hypertension (Chronic)    Elevated blood-pressure reading without diagnosis of hypertension - Primary    Relevant Medications    propranolol LA (Inderal LA) 60 mg 24 hr capsule           Start Inderal       Follow up with GYN and Neuro as directed       Has ENT appointment May           Follow up June 27 follow up - recheck BP and PE

## 2024-04-22 ENCOUNTER — OFFICE VISIT (OUTPATIENT)
Dept: PRIMARY CARE | Facility: CLINIC | Age: 39
End: 2024-04-22
Payer: COMMERCIAL

## 2024-04-22 VITALS
BODY MASS INDEX: 26.29 KG/M2 | TEMPERATURE: 98.9 F | RESPIRATION RATE: 16 BRPM | HEART RATE: 96 BPM | WEIGHT: 172.9 LBS | DIASTOLIC BLOOD PRESSURE: 103 MMHG | OXYGEN SATURATION: 100 % | SYSTOLIC BLOOD PRESSURE: 160 MMHG

## 2024-04-22 DIAGNOSIS — I10 BENIGN ESSENTIAL HYPERTENSION: Chronic | ICD-10-CM

## 2024-04-22 DIAGNOSIS — R03.0 ELEVATED BLOOD-PRESSURE READING WITHOUT DIAGNOSIS OF HYPERTENSION: Primary | Chronic | ICD-10-CM

## 2024-04-22 PROCEDURE — 3080F DIAST BP >= 90 MM HG: CPT | Performed by: FAMILY MEDICINE

## 2024-04-22 PROCEDURE — 3077F SYST BP >= 140 MM HG: CPT | Performed by: FAMILY MEDICINE

## 2024-04-22 PROCEDURE — 1036F TOBACCO NON-USER: CPT | Performed by: FAMILY MEDICINE

## 2024-04-22 PROCEDURE — 99214 OFFICE O/P EST MOD 30 MIN: CPT | Performed by: FAMILY MEDICINE

## 2024-04-22 RX ORDER — CHOLECALCIFEROL (VITAMIN D3) 25 MCG
1000 TABLET ORAL DAILY
COMMUNITY

## 2024-04-22 RX ORDER — PROPRANOLOL HYDROCHLORIDE 60 MG/1
60 CAPSULE, EXTENDED RELEASE ORAL DAILY
Qty: 30 CAPSULE | Refills: 2 | Status: SHIPPED | OUTPATIENT
Start: 2024-04-22 | End: 2024-05-07

## 2024-05-06 ENCOUNTER — APPOINTMENT (OUTPATIENT)
Dept: OTOLARYNGOLOGY | Facility: CLINIC | Age: 39
End: 2024-05-06
Payer: COMMERCIAL

## 2024-05-07 DIAGNOSIS — R03.0 ELEVATED BLOOD-PRESSURE READING WITHOUT DIAGNOSIS OF HYPERTENSION: Chronic | ICD-10-CM

## 2024-05-07 RX ORDER — PROPRANOLOL HYDROCHLORIDE 60 MG/1
60 CAPSULE, EXTENDED RELEASE ORAL DAILY
Qty: 90 CAPSULE | Refills: 0 | Status: SHIPPED | OUTPATIENT
Start: 2024-05-07 | End: 2025-05-07

## 2024-05-22 ENCOUNTER — OFFICE VISIT (OUTPATIENT)
Dept: OTOLARYNGOLOGY | Facility: CLINIC | Age: 39
End: 2024-05-22
Payer: COMMERCIAL

## 2024-05-22 VITALS
BODY MASS INDEX: 26.52 KG/M2 | DIASTOLIC BLOOD PRESSURE: 102 MMHG | HEIGHT: 68 IN | OXYGEN SATURATION: 100 % | SYSTOLIC BLOOD PRESSURE: 167 MMHG | RESPIRATION RATE: 16 BRPM | WEIGHT: 175 LBS | HEART RATE: 76 BPM

## 2024-05-22 DIAGNOSIS — R09.81 NASAL CONGESTION: ICD-10-CM

## 2024-05-22 DIAGNOSIS — J32.1 CHRONIC FRONTAL SINUSITIS: ICD-10-CM

## 2024-05-22 DIAGNOSIS — J31.0 CHRONIC RHINITIS: Primary | ICD-10-CM

## 2024-05-22 DIAGNOSIS — R93.89 ABNORMAL MRI: Chronic | ICD-10-CM

## 2024-05-22 DIAGNOSIS — J34.89 NASAL DRAINAGE: ICD-10-CM

## 2024-05-22 PROCEDURE — 99213 OFFICE O/P EST LOW 20 MIN: CPT | Performed by: STUDENT IN AN ORGANIZED HEALTH CARE EDUCATION/TRAINING PROGRAM

## 2024-05-22 PROCEDURE — 31231 NASAL ENDOSCOPY DX: CPT | Performed by: STUDENT IN AN ORGANIZED HEALTH CARE EDUCATION/TRAINING PROGRAM

## 2024-05-22 PROCEDURE — 3077F SYST BP >= 140 MM HG: CPT | Performed by: STUDENT IN AN ORGANIZED HEALTH CARE EDUCATION/TRAINING PROGRAM

## 2024-05-22 PROCEDURE — 99203 OFFICE O/P NEW LOW 30 MIN: CPT | Performed by: STUDENT IN AN ORGANIZED HEALTH CARE EDUCATION/TRAINING PROGRAM

## 2024-05-22 PROCEDURE — 1036F TOBACCO NON-USER: CPT | Performed by: STUDENT IN AN ORGANIZED HEALTH CARE EDUCATION/TRAINING PROGRAM

## 2024-05-22 PROCEDURE — 3080F DIAST BP >= 90 MM HG: CPT | Performed by: STUDENT IN AN ORGANIZED HEALTH CARE EDUCATION/TRAINING PROGRAM

## 2024-05-22 RX ORDER — OMEPRAZOLE 20 MG/1
20 TABLET, DELAYED RELEASE ORAL
COMMUNITY

## 2024-05-22 RX ORDER — TRIAMCINOLONE ACETONIDE 55 UG/1
2 SPRAY, METERED NASAL DAILY
Qty: 16.5 G | Refills: 11 | Status: SHIPPED | OUTPATIENT
Start: 2024-05-22 | End: 2025-05-22

## 2024-05-22 SDOH — ECONOMIC STABILITY: FOOD INSECURITY: WITHIN THE PAST 12 MONTHS, YOU WORRIED THAT YOUR FOOD WOULD RUN OUT BEFORE YOU GOT MONEY TO BUY MORE.: NEVER TRUE

## 2024-05-22 SDOH — ECONOMIC STABILITY: FOOD INSECURITY: WITHIN THE PAST 12 MONTHS, THE FOOD YOU BOUGHT JUST DIDN'T LAST AND YOU DIDN'T HAVE MONEY TO GET MORE.: NEVER TRUE

## 2024-05-22 ASSESSMENT — ENCOUNTER SYMPTOMS
DEPRESSION: 0
OCCASIONAL FEELINGS OF UNSTEADINESS: 0
LOSS OF SENSATION IN FEET: 0

## 2024-05-22 ASSESSMENT — LIFESTYLE VARIABLES
HOW MANY STANDARD DRINKS CONTAINING ALCOHOL DO YOU HAVE ON A TYPICAL DAY: 1 OR 2
HOW OFTEN DO YOU HAVE SIX OR MORE DRINKS ON ONE OCCASION: LESS THAN MONTHLY
AUDIT-C TOTAL SCORE: 4
HOW OFTEN DO YOU HAVE A DRINK CONTAINING ALCOHOL: 2-3 TIMES A WEEK
SKIP TO QUESTIONS 9-10: 0

## 2024-05-22 ASSESSMENT — PATIENT HEALTH QUESTIONNAIRE - PHQ9
SUM OF ALL RESPONSES TO PHQ9 QUESTIONS 1 AND 2: 0
1. LITTLE INTEREST OR PLEASURE IN DOING THINGS: NOT AT ALL
2. FEELING DOWN, DEPRESSED OR HOPELESS: NOT AT ALL

## 2024-05-22 ASSESSMENT — PAIN SCALES - GENERAL: PAINLEVEL: 0-NO PAIN

## 2024-05-22 ASSESSMENT — COLUMBIA-SUICIDE SEVERITY RATING SCALE - C-SSRS
2. HAVE YOU ACTUALLY HAD ANY THOUGHTS OF KILLING YOURSELF?: NO
1. IN THE PAST MONTH, HAVE YOU WISHED YOU WERE DEAD OR WISHED YOU COULD GO TO SLEEP AND NOT WAKE UP?: NO
6. HAVE YOU EVER DONE ANYTHING, STARTED TO DO ANYTHING, OR PREPARED TO DO ANYTHING TO END YOUR LIFE?: NO

## 2024-05-22 NOTE — PATIENT INSTRUCTIONS
"Please use the combination of nasal steroid spray and saline irrigations for the next several months. Then see me again in 2-3 months to check in.    NASAL STEROID SPRAY INSTRUCTIONS    Please take the prescribed nasal spray as directed. BE SURE TO POINT THE SPRAY TOWARDS THE CORNER OF THE EYE ON THE SAME SIDE NOSTRIL. This will ensure you are treating the appropriate parts of your nose that are swollen or inflamed.    This medication will take upwards of one month before you notice full benefit. You MUST use it every day for it to be effective.    SALINE IRRIGATION INSTRUCTIONS    The Benefits:  When you irrigate, or \"rinse,\" your nose the isotonic saline (salt water) washes mucous, crusts, and other debris from your nose (allergens, irritants from the environment) that may be contributing to your symptoms. It also helps prepare the lining of your nose for any nasal medications you may be taking.    Instructions: Stand over the sink with your head forward over the sink or in the bathtub to prevent water from going down into your throat. The goal is to get the irrigation back out of the opposite nostril after irrigation. Irrigate each side of the nose with 4 oz (about 120 milliliters) 1-3 times/day. You may buy the salt packets that come with the [employer] irrigation bottle or use recipe provided below to make your own nasal saline. Irrigate each side of the nose with mild force/squeezing of bottle. If you feel like the solution is going into your ears adjust your head angle or use less force with the bottle. The first couple times you use the solution your nose may burn a bit but this will go away with time.    Recipe to Make Own Nasal Saline Solution:  Mix 8 oz of tap water (be sure to boil it then let it cool down) or distilled water with 1/2 tsp of baking soda and 1/2 tsp of table salt and shake bottle to dissolve.    - If you are using a steroid nasal spray in addition to the irrigation, irrigate first, then use " the topical steroid spray otherwise you will wash the steroid out of your nose with the irrigation   - Optimal use is twice per day  - Irrigations have been proven to be more effective compared with salt water spray  - Nasal irrigations performed with a large volume and delivered with low positive pressure are more effective than saline sprays over an 8-week period for treatment of chronic nasal and sinus symptoms    Reference: Joaquim ALVARADO et al. Nasal saline irrigations for the symptoms of acute and chronic rhinosinusitis. Arch OtolaryngolHeadNeckSurg. 2007;133(11):2027-6415.

## 2024-05-22 NOTE — PROGRESS NOTES
SUBJECTIVE  Patient ID: Milagros Henson is a 39 y.o. female who presents for chronic sinusitis.    The patient reports that she has been getting worked up for headaches; MRI was concerning for CRS. She notes some PND worse in the morning. She notes nasal congestion worse at night. The approximate duration of the patient’s complaint is years. She reports that she was getting daily frontal headaches; has significantly improved with B2 and magnesium. They deny epistaxis and change in sense of smell. They endorse a history of environmental allergies; no recent formal allergy testing. No history of asthma. They deny a history of nasal/sinus surgery/trauma.    Previously tried saline irrigations and oral antihistamine (daily Zyrtec).    Review of Systems  Complete ROS negative except as noted above or on patient intake form and as above.    OBJECTIVE  Physical Exam  CONSTITUTIONAL: Well appearing female who appears stated age.  PSYCHIATRIC: Alert, appropriate mood and affect.  RESPIRATORY: Normal inspiration and expiration and chest wall expansion; no use of accessory muscles to breathe.  VOICE: Clear speech without hoarseness. No stridor nor stertor.  HEAD, FACE, AND SKIN: Symmetric facial feature. No cutaneous masses or lesions were visualized. The parotid and submandibular glands were normal to palpation.  EYES: Pupils were equal in size and reactive to light. Extra-ocular muscle function was intact. No nystagmus was observed. Vision was grossly intact.  EARS: External ears were normally formed with no lesions. The external auditory canals were clear. The tympanic membranes were intact and in the neutral position. No significant retraction pockets nor effusions were appreciated.  NOSE: Nasal dorsum was midline. Anterior rhinoscopy demonstrated a septum midline; slightly dry. Inferior turbinates were mildly hypertrophied. No obvious nasal masses, polyps, mucopurulence, nor other lesions were appreciated.  ORAL CAVITY:  Lips were without lesions. Moist mucous membranes. No lesions appreciated along the gingiva, oral mucosa, nor tongue.  DENTITION: Grossly normal without obvious infection nor inflammation.  OROPHARYNX: No lesion nor mucosal abnormality. The uvula was normal appearing. The tonsils were 1+. Mild cobblestoning.  NECK: Visualization and palpation of the neck revealed no mass lesions, no thyromegaly or thyroid masses. No cutaneous lesions appreciated.  LYMPHATICS (CERVICAL): There were no palpable lymph nodes in the posterior triangle, submandibular triangle, jugulodigastric region, nor central neck.  NEUROLOGIC: Cranial nerves III, IV, and VI were noted to be intact via extra-ocular muscle movement testing. Cranial nerve V was noted to be intact to soft touch bilaterally. Cranial nerve VII was noted to be intact and symmetric by facial movement. Cranial nerve VIII was tested with normal voice examination and revealed grossly normal hearing. Cranial nerves IX and X noted to be intact by palatal movement. Cranial nerve XI noted to be intact via shoulder shrug.  Cranial nerve XII noted to be intact with active and symmetric tongue movement.      Radiology  MRI brain 2/21/2024  I personally reviewed the patient's recent brain imaging.  I appreciate mild inflammation involving the right maxillary sinus.  There appears to be a mucous retention cyst within the left maxillary sinus.  Tracking along the left anterior ethmoid air cells to the frontal sinus outflow tract and frontal sinus is significant inflammation consistent with chronic sinusitis.      --------------------------------------------------  Procedure: Nasal endoscopy - Diagnostic  Indication: Chronic rhinitis, frontal CRS  Informed consent verbally obtained: The risks, benefits, alternatives, and expectations were discussed with the patient, who wished to proceed  Anesthesia: Topical lidocaine/oxymetazoline    Findings: After topical anesthetic was applied the  nasal cavities were examined with a flexible endoscope. The septum was midline.  - Right: The inferior turbinate was mildly hypertrophied. The middle turbinate appeared healthy; the middle meatus and spheno-ethmoid recess were free of purulence, masses, lesions, or polyps. The nasal passageway is patent.  Some clear drainage appreciated especially anteriorly.  - Left: The inferior turbinate was mildly hypertrophied. The middle turbinate appeared healthy; the middle meatus and spheno-ethmoid recess were free of purulence, masses, lesions, or polyps. The nasal passageway is patent.  Some clear drainage noted.    The nasopharynx was clear.  No pharyngeal/pharyngeal lesions or masses.  There is mild pharyngeal cobblestoning.  There is bilateral symmetric true vocal fold motion.  No significant laryngeal edema.    There were no complications and the patient tolerated the procedure well.  --------------------------------------------------    ASSESSMENT/PLAN  Diagnoses and all orders for this visit:  Chronic rhinitis  -     triamcinolone (Nasacort) 55 mcg nasal inhaler; Administer 2 sprays into each nostril once daily.  Abnormal MRI  -     Referral to ENT  Chronic frontal sinusitis  Nasal congestion  Nasal drainage      39 y.o. female presenting with abnormal imaging suggestive of chronic frontal sinusitis in the setting of headaches.    1.  Chronic/allergic rhinitis, chronic frontal sinusitis, maxillary mucous retention cyst, nasal congestion/drainage  The patient notes longstanding history of suspected environmental allergies causing mild allergic rhinitis.  However, over the last several years she has appreciated persistent and daily headaches involving the forehead.  During workup for headaches she obtained MRI imaging which suggested chronic left frontal sinusitis as a potential etiology.  However, patient is now on treatment for headaches and has noted significant improvement.    Endoscopy: Some clear drainage. No  purulence. No mass.  Imaging: MRI brain - left CRS frontal    I discussed my findings with the patient and offered reassurance and counseling.  The patient has imaging suggestive of chronic rhinosinusitis as a potential etiology for her headaches.  However, she is experiencing improvement on current medications.  We discussed her options moving forward including observation, conservative medical therapy, and aggressive medical therapy with posttreatment imaging with workup toward surgery.  The patient is interested in avoiding procedures if possible and like to trial a nasal steroid medication potentially in combination with irrigations over the next several months.  She will then follow-up to discuss any change in symptoms.    We did discuss that we cannot rule out tumors, both benign and malignant, as potential etiology for obstruction of the frontal sinus outflow tract.  However, the patient is extremely low risk for any type of malignancy.  In addition, given her history of allergic rhinitis this is the more likely etiology for her findings.  For now I think it is very reasonable to continue her current medical therapy with addition of a nasal steroid spray.  She will continue to be treated with Dr. Sandy.    This note was created using speech recognition transcription software. Despite proofreading, typographical errors may be present that affect the meaning of the content. Please contact my office with any questions.

## 2024-05-22 NOTE — LETTER
May 22, 2024     Damaris Zeng MD  [address] [facility]  [facility] [address]    Patient: Milagros Henson   YOB: 1985   Date of Visit: 5/22/2024       Dear Dr. Damaris Zeng MD:    Thank you for referring Milagros Henson to me for evaluation. Below are my notes for this consultation.  If you have questions, please do not hesitate to call me. I look forward to following your patient along with you.       Sincerely,     Alistair Alfaro MD      CC: Isabel Sandy, DO  ______________________________________________________________________________________    SUBJECTIVE  Patient ID: Milagros Henson is a 39 y.o. female who presents for chronic sinusitis.    The patient reports that she has been getting worked up for headaches; MRI was concerning for CRS. She notes some PND worse in the morning. She notes nasal congestion worse at night. The approximate duration of the patient’s complaint is years. She reports that she was getting daily frontal headaches; has significantly improved with B2 and magnesium. They deny epistaxis and change in sense of smell. They endorse a history of environmental allergies; no recent formal allergy testing. No history of asthma. They deny a history of nasal/sinus surgery/trauma.    Previously tried saline irrigations and oral antihistamine (daily Zyrtec).    Review of Systems  Complete ROS negative except as noted above or on patient intake form and as above.    OBJECTIVE  Physical Exam  CONSTITUTIONAL: Well appearing female who appears stated age.  PSYCHIATRIC: Alert, appropriate mood and affect.  RESPIRATORY: Normal inspiration and expiration and chest wall expansion; no use of accessory muscles to breathe.  VOICE: Clear speech without hoarseness. No stridor nor stertor.  HEAD, FACE, AND SKIN: Symmetric facial feature. No cutaneous masses or lesions were visualized. The parotid and submandibular glands were normal to palpation.  EYES: Pupils were equal in  size and reactive to light. Extra-ocular muscle function was intact. No nystagmus was observed. Vision was grossly intact.  EARS: External ears were normally formed with no lesions. The external auditory canals were clear. The tympanic membranes were intact and in the neutral position. No significant retraction pockets nor effusions were appreciated.  NOSE: Nasal dorsum was midline. Anterior rhinoscopy demonstrated a septum midline; slightly dry. Inferior turbinates were mildly hypertrophied. No obvious nasal masses, polyps, mucopurulence, nor other lesions were appreciated.  ORAL CAVITY: Lips were without lesions. Moist mucous membranes. No lesions appreciated along the gingiva, oral mucosa, nor tongue.  DENTITION: Grossly normal without obvious infection nor inflammation.  OROPHARYNX: No lesion nor mucosal abnormality. The uvula was normal appearing. The tonsils were 1+. Mild cobblestoning.  NECK: Visualization and palpation of the neck revealed no mass lesions, no thyromegaly or thyroid masses. No cutaneous lesions appreciated.  LYMPHATICS (CERVICAL): There were no palpable lymph nodes in the posterior triangle, submandibular triangle, jugulodigastric region, nor central neck.  NEUROLOGIC: Cranial nerves III, IV, and VI were noted to be intact via extra-ocular muscle movement testing. Cranial nerve V was noted to be intact to soft touch bilaterally. Cranial nerve VII was noted to be intact and symmetric by facial movement. Cranial nerve VIII was tested with normal voice examination and revealed grossly normal hearing. Cranial nerves IX and X noted to be intact by palatal movement. Cranial nerve XI noted to be intact via shoulder shrug.  Cranial nerve XII noted to be intact with active and symmetric tongue movement.      Radiology  MRI brain 2/21/2024  I personally reviewed the patient's recent brain imaging.  I appreciate mild inflammation involving the right maxillary sinus.  There appears to be a mucous  retention cyst within the left maxillary sinus.  Tracking along the left anterior ethmoid air cells to the frontal sinus outflow tract and frontal sinus is significant inflammation consistent with chronic sinusitis.      --------------------------------------------------  Procedure: Nasal endoscopy - Diagnostic  Indication: Chronic rhinitis, frontal CRS  Informed consent verbally obtained: The risks, benefits, alternatives, and expectations were discussed with the patient, who wished to proceed  Anesthesia: Topical lidocaine/oxymetazoline    Findings: After topical anesthetic was applied the nasal cavities were examined with a flexible endoscope. The septum was midline.  - Right: The inferior turbinate was mildly hypertrophied. The middle turbinate appeared healthy; the middle meatus and spheno-ethmoid recess were free of purulence, masses, lesions, or polyps. The nasal passageway is patent.  Some clear drainage appreciated especially anteriorly.  - Left: The inferior turbinate was mildly hypertrophied. The middle turbinate appeared healthy; the middle meatus and spheno-ethmoid recess were free of purulence, masses, lesions, or polyps. The nasal passageway is patent.  Some clear drainage noted.    The nasopharynx was clear.  No pharyngeal/pharyngeal lesions or masses.  There is mild pharyngeal cobblestoning.  There is bilateral symmetric true vocal fold motion.  No significant laryngeal edema.    There were no complications and the patient tolerated the procedure well.  --------------------------------------------------    ASSESSMENT/PLAN  Diagnoses and all orders for this visit:  Chronic rhinitis  -     triamcinolone (Nasacort) 55 mcg nasal inhaler; Administer 2 sprays into each nostril once daily.  Abnormal MRI  -     Referral to ENT  Chronic frontal sinusitis  Nasal congestion  Nasal drainage      39 y.o. female presenting with abnormal imaging suggestive of chronic frontal sinusitis in the setting of  headaches.    1.  Chronic/allergic rhinitis, chronic frontal sinusitis, maxillary mucous retention cyst, nasal congestion/drainage  The patient notes longstanding history of suspected environmental allergies causing mild allergic rhinitis.  However, over the last several years she has appreciated persistent and daily headaches involving the forehead.  During workup for headaches she obtained MRI imaging which suggested chronic left frontal sinusitis as a potential etiology.  However, patient is now on treatment for headaches and has noted significant improvement.    Endoscopy: Some clear drainage. No purulence. No mass.  Imaging: MRI brain - left CRS frontal    I discussed my findings with the patient and offered reassurance and counseling.  The patient has imaging suggestive of chronic rhinosinusitis as a potential etiology for her headaches.  However, she is experiencing improvement on current medications.  We discussed her options moving forward including observation, conservative medical therapy, and aggressive medical therapy with posttreatment imaging with workup toward surgery.  The patient is interested in avoiding procedures if possible and like to trial a nasal steroid medication potentially in combination with irrigations over the next several months.  She will then follow-up to discuss any change in symptoms.    We did discuss that we cannot rule out tumors, both benign and malignant, as potential etiology for obstruction of the frontal sinus outflow tract.  However, the patient is extremely low risk for any type of malignancy.  In addition, given her history of allergic rhinitis this is the more likely etiology for her findings.  For now I think it is very reasonable to continue her current medical therapy with addition of a nasal steroid spray.  She will continue to be treated with Dr. Sandy.    This note was created using speech recognition transcription software. Despite proofreading, typographical  errors may be present that affect the meaning of the content. Please contact my office with any questions.

## 2024-06-05 ENCOUNTER — HOSPITAL ENCOUNTER (OUTPATIENT)
Dept: NEUROLOGY | Facility: CLINIC | Age: 39
Discharge: HOME | End: 2024-06-05
Payer: COMMERCIAL

## 2024-06-05 DIAGNOSIS — R42 DIZZINESS: ICD-10-CM

## 2024-06-05 PROCEDURE — 95924 ANS PARASYMP & SYMP W/TILT: CPT | Performed by: PSYCHIATRY & NEUROLOGY

## 2024-06-05 PROCEDURE — 95923 AUTONOMIC NRV SYST FUNJ TEST: CPT | Performed by: PSYCHIATRY & NEUROLOGY

## 2024-06-10 ENCOUNTER — TELEPHONE (OUTPATIENT)
Dept: NEUROSURGERY | Facility: CLINIC | Age: 39
End: 2024-06-10
Payer: COMMERCIAL

## 2024-06-10 NOTE — TELEPHONE ENCOUNTER
----- Message from Milagros Henson sent at 6/10/2024  1:33 PM EDT -----  Regarding: Autonomic testing  Contact: [phone]  Great thanks!

## 2024-06-12 ENCOUNTER — TELEPHONE (OUTPATIENT)
Dept: NEUROLOGY | Facility: CLINIC | Age: 39
End: 2024-06-12
Payer: COMMERCIAL

## 2024-06-12 NOTE — TELEPHONE ENCOUNTER
----- Message from Isabel Sandy DO sent at 6/11/2024  8:43 AM EDT -----  Please let the patient know that her autonomic testing is consistent with POTs (elevated heart rate with standing up). I would like to see her back in the office to discuss these results.

## 2024-06-20 ENCOUNTER — OFFICE VISIT (OUTPATIENT)
Dept: NEUROLOGY | Facility: CLINIC | Age: 39
End: 2024-06-20
Payer: COMMERCIAL

## 2024-06-20 ENCOUNTER — APPOINTMENT (OUTPATIENT)
Dept: PRIMARY CARE | Facility: CLINIC | Age: 39
End: 2024-06-20
Payer: COMMERCIAL

## 2024-06-20 VITALS
HEIGHT: 68 IN | SYSTOLIC BLOOD PRESSURE: 151 MMHG | WEIGHT: 175.2 LBS | DIASTOLIC BLOOD PRESSURE: 104 MMHG | HEART RATE: 72 BPM | BODY MASS INDEX: 26.55 KG/M2

## 2024-06-20 DIAGNOSIS — G90.A POTS (POSTURAL ORTHOSTATIC TACHYCARDIA SYNDROME): Primary | ICD-10-CM

## 2024-06-20 PROCEDURE — 99215 OFFICE O/P EST HI 40 MIN: CPT | Performed by: PSYCHIATRY & NEUROLOGY

## 2024-06-20 PROCEDURE — 1036F TOBACCO NON-USER: CPT | Performed by: PSYCHIATRY & NEUROLOGY

## 2024-06-20 PROCEDURE — 3077F SYST BP >= 140 MM HG: CPT | Performed by: PSYCHIATRY & NEUROLOGY

## 2024-06-20 PROCEDURE — 3080F DIAST BP >= 90 MM HG: CPT | Performed by: PSYCHIATRY & NEUROLOGY

## 2024-06-20 RX ORDER — BUTYROSPERMUM PARKII(SHEA BUTTER), SIMMONDSIA CHINENSIS (JOJOBA) SEED OIL, ALOE BARBADENSIS LEAF EXTRACT .01; 1; 3.5 G/100G; G/100G; G/100G
250 LIQUID TOPICAL 2 TIMES DAILY
COMMUNITY

## 2024-06-20 ASSESSMENT — PATIENT HEALTH QUESTIONNAIRE - PHQ9
SUM OF ALL RESPONSES TO PHQ9 QUESTIONS 1 AND 2: 0
2. FEELING DOWN, DEPRESSED OR HOPELESS: NOT AT ALL
1. LITTLE INTEREST OR PLEASURE IN DOING THINGS: NOT AT ALL

## 2024-06-20 ASSESSMENT — PAIN SCALES - GENERAL: PAINLEVEL: 0-NO PAIN

## 2024-06-20 NOTE — PROGRESS NOTES
"Subjective   Milagros Henson is a 39 y.o. female who comes in for follow up of autonomic testing.    Reports that she has the worst dizziness first thing in the morning when she wakes up and feels ok through the day however has a lot of fatigue.      Review of Systems    Objective   BP (!) 151/104 (BP Location: Right arm, Patient Position: Sitting, BP Cuff Size: Adult)   Pulse 72   Ht 1.727 m (5' 8\")   Wt 79.5 kg (175 lb 3.2 oz)   BMI 26.64 kg/m²   Neurological Exam  Physical Exam        MR brain w and wo IV contrast    Result Date: 2/22/2024  Interpreted By:  Parminder Oliva, STUDY: MR BRAIN W AND WO IV CONTRAST; ;  2/21/2024 5:41 pm   INDICATION: Signs/Symptoms:headaches and dizziness in the early morning with waking.   COMPARISON: None.   ACCESSION NUMBER(S): [id number]   ORDERING CLINICIAN: RAQUEL LEONARD   TECHNIQUE: MRI of the brain was performed with the acquisition of axial diffusion-weighted, axial T1, axial FLAIR, axial T2 gradient echo, axial T2 fat saturated, axial T1 fat saturated postcontrast sequence, and volumetric axial T1 post contrast sequence with multiplanar reformats.   Contrast: 15 mL of Dotarem was injected intravenously.   FINDINGS: There is no acute intracranial hemorrhage or infarct. There is no abnormal extra-axial fluid collection or mass effect.   The ventricles, sulci, and basilar cisterns are normal in size, shape, and configuration.   There is no striking signal abnormality located within the brain parenchyma.   There is no abnormal intracranial enhancement or mass.   There is complete opacification of the left frontal sinus and frontal recess with mucosal thickening. There is T1 hyperintense signal within the lumen of the left frontal sinus which is most consistent with inspissated mucus versus noninvasive fungal elements. There is mild mucosal thickening and small mucosal cysts located within the bilateral maxillary sinuses. The mastoid air cells are clear.       1. " Unremarkable appearance of the intracranial compartment.   2. Chronic appearing mucosal disease within the paranasal sinuses, most pronounced within the left frontal sinus and recess. Correlate with symptoms of sinusitis in this patient with history of headaches.   This study was interpreted at [facility].   MACRO: None   Signed by: Parminder Oliva 2/22/2024 9:04 AM Dictation workstation:   [id number]      Reviewed the data of her autonomic study which showed elevated of heart rate from 76 to 112 with decreased sweating in the foot.     Assessment/Plan   Ms. Henson is a 38 year old woman presenting to then neurology clinic today for follow up of headache and dizziness. Autonomic testing most consistent with POTs. Will check blood work to look for secondary causes lui given decreased sweat in the foot suggestive of small fiber involvement (although patient mostly asymptomatic).   Given history of HTN, will not recommend increased sodium intake.  She needs better control of her HTN and suspect taking BP meds at night might be better tolerated.  Also consider switching propranolol to immediate release 20 mg BID for POTs if another med is add on for BP.  Referral to cardiology to help with management of POTs provided.     Migraines controlled with magnesium and riboflavin.    Isabel Sandy,      For headaches will try magnesium and riboflavin. If no improvement consider starting propranolol.

## 2024-06-20 NOTE — PATIENT INSTRUCTIONS
Autonomic testing is most consistent with POTs although need to rule out iron deficiency. Given your high blood pressure would like you to see cardiology for their opinion on treatment. I would not increase your sodium intake however increase water intake to 3 L per day and increase aerobic exercise to at least 4 times a week of 30 minutes of aerobic activity (need to get HR up - should not be able to hold a conversation during it).   I think you would benefit from the propranolol being switched to the immediate release and possibly having another BP med at night.   Will call with results of blood work and see what cardiology thinks then decide about follow up.       Compression stockings can be helpful in the winter if you are going to be getting up and down a lot.

## 2024-06-24 PROBLEM — Z00.00 WELL ADULT EXAM: Chronic | Status: ACTIVE | Noted: 2024-06-24

## 2024-06-24 NOTE — PROGRESS NOTES
Subjective      HPI:    Per OV Note 06/20/2024(Dr. Rodriguez Neuro):  Given history of HTN, will not recommend increased sodium intake. She needs better control of her HTN and suspect taking BP meds at night might be better tolerated. Also consider switching propranolol to immediate release 20 mg BID for POTs if another med is add on for BP. Referral to cardiology to help with management of POTs provided. - Dr Ramicone       Milagros Henson is a 39 y.o. female 39 y.o. is here today for PE/health maintenance      Chief Complaint   Patient presents with    Annual Exam    Follow-up     Pt is scheduled with Dr. Ramicone (Cardiologist) 07/19/2024, on the advice of Dr. Rodriguez (neuro)           Pt also due for f/up chronic medical problems-recheck BP- 4/22/24- started Propranolol     BP is improved today       Camps in [place] every weekend       Health Maintenance Topics        Topic Date     Yearly Adult Physical today    HIV Screening Never done    Cervical Cancer Screening Per Dr Farrell     Health Maintenance Topics with due status: Not Due       Topic Last Completion Date    DTaP/Tdap/Td Vaccines 10/20/2014- today      Health Maintenance Topics with due status: Completed       Topic Last Completion Date    Hepatitis C Screening 06/20/2022     Health Maintenance Topics with due status: Aged Out       Topic Date Due    HIB Vaccines Aged Out    IPV Vaccines Aged Out    Hepatitis A Vaccines Aged Out    Meningococcal Vaccine Aged Out    Rotavirus Vaccines Aged Out    HPV Vaccines Aged Out    Pneumococcal Vaccine: Pediatrics (0 to 5 Years) and At-Risk Patients (6 to 64 Years) Aged Out                   Immunization History   Administered Date(s) Administered    Influenza, Unspecified 10/20/2014    Influenza, seasonal, injectable, preservative free 10/20/2014    Moderna SARS-CoV-2 Vaccination 04/01/2021, 04/29/2021    Tdap vaccine, age 7 year and older (BOOSTRIX, ADACEL) 10/20/2014         Social History     Tobacco  "Use   Smoking Status Never    Passive exposure: Never   Smokeless Tobacco Never                 Social History     Substance and Sexual Activity   Alcohol Use Yes    Comment: 15 drinks over 7 days                    Current Outpatient Medications:     cetirizine (ZyrTEC) 10 mg capsule, Take by mouth., Disp: , Rfl:     cholecalciferol (Vitamin D3) 25 MCG (1000 UT) tablet, Take 1 tablet (1,000 Units) by mouth once daily., Disp: , Rfl:     Lactobacillus acidophilus (PROBIOTIC ORAL), Take by mouth., Disp: , Rfl:     MAGNESIUM ORAL, Take by mouth., Disp: , Rfl:     multivitamin tablet, Take by mouth., Disp: , Rfl:     omeprazole OTC (PriLOSEC OTC) 20 mg EC tablet, Take 1 tablet (20 mg) by mouth once daily in the morning. Take before meals. Do not crush, chew, or split., Disp: , Rfl:     propranolol LA (Inderal LA) 60 mg 24 hr capsule, Take 1 capsule (60 mg) by mouth once daily., Disp: 90 capsule, Rfl: 0    psyllium seed, with sugar, (FIBER ORAL), Take by mouth., Disp: , Rfl:     riboflavin, vitamin B2, (VITAMIN B-2 ORAL), Take by mouth., Disp: , Rfl:     segesterone ac-ethin estradioL (Annovera) 0.15-0.013 mg/24 hour ring, Insert into the vagina every 28 (twenty-eight) days., Disp: , Rfl:     triamcinolone (Nasacort) 55 mcg nasal inhaler, Administer 2 sprays into each nostril once daily., Disp: 16.5 g, Rfl: 11    omeprazole (PriLOSEC) 20 mg DR capsule, Take 1 capsule (20 mg) by mouth 2 times a day. Do not crush or chew., Disp: 60 capsule, Rfl: 1      Review of Systems      Review of Systems        Objective        PE:          Visit Vitals  /90 (BP Location: Left arm, Patient Position: Sitting, BP Cuff Size: Adult)   Pulse 77   Temp 36.7 °C (98 °F)   Resp 14   Ht 1.727 m (5' 8\")   Wt 79.2 kg (174 lb 8 oz)   SpO2 98%   BMI 26.53 kg/m²   OB Status Having periods   Smoking Status Never   BSA 1.95 m²                    Pt is A and O x3, NAD  Head- normocephalic and atraumatic,   EYES- conjunctiva- normal   lids- " normal  EARS/NOSE- TM's normal, nasopharynx- normal and atraumatic  OROPHARYNX- normal  NECK- supple, FROM  THYROID- NT, normal size, no nodule noted  LYMPH- no cervical lymph nodes palpated   CV- RRR without murmur  PULM- CTA bilaterally, normal respiratory effort  RESPIRATORY EFFORT- normal , no retractions or nasal flaring   ABD- normoactive BS's , soft , NT, no hepatosplenomegaly palpated  EXT- no edema,NT  SKIN- no abnormal skin lesions noted  NEURO- no focal deficits  PSYCH- pleasant, normal judgement and insight    BP Readings from Last 3 Encounters:   06/27/24 135/90   06/20/24 (!) 151/104   05/22/24 (!) 167/102         Wt Readings from Last 3 Encounters:   06/27/24 79.2 kg (174 lb 8 oz)   06/20/24 79.5 kg (175 lb 3.2 oz)   05/22/24 79.4 kg (175 lb)         BMI Readings from Last 3 Encounters:   06/27/24 26.53 kg/m²   06/20/24 26.64 kg/m²   05/22/24 26.61 kg/m²       The number and complexity of problems addressed is considered moderate.  The amount and/or complexity of data reviewed and analyzed is considered moderate. The risk of complications and/or morbidity/mortality of patient is considered moderate. Overall, this patient encounter is considered a moderate risk visit.    Common Side Effects- Beta-blockers:    Dizziness  Feeling tired  Feeling lightheaded  Vision problems  Swelling of the hands, feet, ankles, or legs  Decreased sexual ability    Call your doctor if you have any of these signs:  Chest pain  Irregular heartbeat  Painful erection in men     Assessment/Plan      Problem List Items Addressed This Visit          Active Problems    Benign essential hypertension (Chronic)    Immunization due (Chronic)    Relevant Orders    Tdap vaccine, age 7 years and older    POTS (postural orthostatic tachycardia syndrome) (Chronic)    Primary hypertension (Chronic)    Relevant Orders    Basic Metabolic Panel    Screening for cholesterol level (Chronic)    Relevant Orders    Lipid Panel    Screening for HIV  (human immunodeficiency virus) (Chronic)    Relevant Orders    HIV 1/2 Antigen/Antibody Screen with Reflex to Confirmation    Well adult exam - Primary (Chronic)    Relevant Orders    Follow Up In [facility] - PCP - Established       Orders Placed This Encounter   Procedures    Tdap vaccine, age 7 years and older    Basic Metabolic Panel    Lipid Panel    HIV 1/2 Antigen/Antibody Screen with Reflex to Confirmation             Follow up 12 months - PE            Recommendations for women annual wellness exam:   Make sure screenings for cervical and breast cancer are up to date if applicable- pap smears age 21-65  mammogram starting at age 35- 40 or sooner if positive family history of breast cancer   colonoscopy at age 45, earlier if positive family history for breast or colon cancer   Screen for osteoporosis with DXA bone scan starting at age 65 or sooner if risk factors present (long term steroid use, smoking, heavy alcohol use, history of fracture, rheumatoid arthritis, low body weight, family history of hip fracture)  Screening for lung cancer with low-dose CT in all adults age 50-80 who have a 20 pack-year smoking history and currently smoke or have quit within the past 15 years; and are symptom free/no prior pulmonary diagnosis  Gardisil vaccine age 9-26 years of age   Follow a healthy diet (Examples, Dash diet, Mediterranean diet)  Exercise 150 minutes per week   Maintain healthy weight (BMI < 25)  Do not smoke   Alcohol in moderation (up to 1 drink/day)  Get enough sleep (7-8 hours/night)  Take a prenatal vitamin with folic acid if possibility of pregnancy   Make sure immunizations are up to date   Recommend minimum 1,000 mg calcium and 600-800 IU vitamin D daily (combination of diet + supplement)- unless there is a contraindication   Visit dentist twice yearly      If you are less than 60 years old, have diabetes mellitus, or chronic kidney disease, your goal blood pressure is < 140/90.  If you  are older than 60 years old and do not have diabetes or kidney disease, your goal blood pressure is < 150/90.

## 2024-06-27 ENCOUNTER — LAB (OUTPATIENT)
Dept: LAB | Facility: LAB | Age: 39
End: 2024-06-27
Payer: COMMERCIAL

## 2024-06-27 ENCOUNTER — TELEPHONE (OUTPATIENT)
Dept: PRIMARY CARE | Facility: CLINIC | Age: 39
End: 2024-06-27

## 2024-06-27 ENCOUNTER — APPOINTMENT (OUTPATIENT)
Dept: PRIMARY CARE | Facility: CLINIC | Age: 39
End: 2024-06-27
Payer: COMMERCIAL

## 2024-06-27 VITALS
RESPIRATION RATE: 14 BRPM | HEART RATE: 77 BPM | SYSTOLIC BLOOD PRESSURE: 135 MMHG | DIASTOLIC BLOOD PRESSURE: 90 MMHG | BODY MASS INDEX: 26.45 KG/M2 | WEIGHT: 174.5 LBS | OXYGEN SATURATION: 98 % | HEIGHT: 68 IN | TEMPERATURE: 98 F

## 2024-06-27 DIAGNOSIS — I10 BENIGN ESSENTIAL HYPERTENSION: Chronic | ICD-10-CM

## 2024-06-27 DIAGNOSIS — Z23 IMMUNIZATION DUE: Chronic | ICD-10-CM

## 2024-06-27 DIAGNOSIS — Z11.4 SCREENING FOR HIV (HUMAN IMMUNODEFICIENCY VIRUS): Chronic | ICD-10-CM

## 2024-06-27 DIAGNOSIS — G90.A POTS (POSTURAL ORTHOSTATIC TACHYCARDIA SYNDROME): Chronic | ICD-10-CM

## 2024-06-27 DIAGNOSIS — I10 PRIMARY HYPERTENSION: Chronic | ICD-10-CM

## 2024-06-27 DIAGNOSIS — G90.A POTS (POSTURAL ORTHOSTATIC TACHYCARDIA SYNDROME): ICD-10-CM

## 2024-06-27 DIAGNOSIS — Z13.220 SCREENING FOR CHOLESTEROL LEVEL: Chronic | ICD-10-CM

## 2024-06-27 DIAGNOSIS — Z00.00 WELL ADULT EXAM: Primary | Chronic | ICD-10-CM

## 2024-06-27 LAB
ANION GAP SERPL CALC-SCNC: 14 MMOL/L (ref 10–20)
BUN SERPL-MCNC: 11 MG/DL (ref 6–23)
CALCIUM SERPL-MCNC: 9.3 MG/DL (ref 8.6–10.6)
CHLORIDE SERPL-SCNC: 105 MMOL/L (ref 98–107)
CHOLEST SERPL-MCNC: 126 MG/DL (ref 0–199)
CHOLESTEROL/HDL RATIO: 2.3
CO2 SERPL-SCNC: 24 MMOL/L (ref 21–32)
CREAT SERPL-MCNC: 0.92 MG/DL (ref 0.5–1.05)
EGFRCR SERPLBLD CKD-EPI 2021: 81 ML/MIN/1.73M*2
ERYTHROCYTE [DISTWIDTH] IN BLOOD BY AUTOMATED COUNT: 12.9 % (ref 11.5–14.5)
FERRITIN SERPL-MCNC: 53 NG/ML (ref 8–150)
GLUCOSE SERPL-MCNC: 97 MG/DL (ref 74–99)
HCT VFR BLD AUTO: 35.9 % (ref 36–46)
HDLC SERPL-MCNC: 54.6 MG/DL
HGB BLD-MCNC: 11.7 G/DL (ref 12–16)
HIV 1+2 AB+HIV1 P24 AG SERPL QL IA: NONREACTIVE
IRON SATN MFR SERPL: 29 % (ref 25–45)
IRON SERPL-MCNC: 129 UG/DL (ref 35–150)
LDLC SERPL CALC-MCNC: 21 MG/DL
MCH RBC QN AUTO: 28.6 PG (ref 26–34)
MCHC RBC AUTO-ENTMCNC: 32.6 G/DL (ref 32–36)
MCV RBC AUTO: 88 FL (ref 80–100)
NON HDL CHOLESTEROL: 71 MG/DL (ref 0–149)
NRBC BLD-RTO: 0 /100 WBCS (ref 0–0)
PLATELET # BLD AUTO: 288 X10*3/UL (ref 150–450)
POTASSIUM SERPL-SCNC: 4.2 MMOL/L (ref 3.5–5.3)
PROT SERPL-MCNC: 7.2 G/DL (ref 6.4–8.2)
RBC # BLD AUTO: 4.09 X10*6/UL (ref 4–5.2)
SODIUM SERPL-SCNC: 139 MMOL/L (ref 136–145)
TIBC SERPL-MCNC: 448 UG/DL (ref 240–445)
TRIGL SERPL-MCNC: 251 MG/DL (ref 0–149)
UIBC SERPL-MCNC: 319 UG/DL (ref 110–370)
VIT B12 SERPL-MCNC: 264 PG/ML (ref 211–911)
VLDL: 50 MG/DL (ref 0–40)
WBC # BLD AUTO: 5 X10*3/UL (ref 4.4–11.3)

## 2024-06-27 PROCEDURE — 83921 ORGANIC ACID SINGLE QUANT: CPT

## 2024-06-27 PROCEDURE — 87389 HIV-1 AG W/HIV-1&-2 AB AG IA: CPT

## 2024-06-27 PROCEDURE — 83521 IG LIGHT CHAINS FREE EACH: CPT

## 2024-06-27 PROCEDURE — 86334 IMMUNOFIX E-PHORESIS SERUM: CPT

## 2024-06-27 PROCEDURE — 90471 IMMUNIZATION ADMIN: CPT | Performed by: FAMILY MEDICINE

## 2024-06-27 PROCEDURE — 99395 PREV VISIT EST AGE 18-39: CPT | Performed by: FAMILY MEDICINE

## 2024-06-27 PROCEDURE — 83550 IRON BINDING TEST: CPT

## 2024-06-27 PROCEDURE — 84155 ASSAY OF PROTEIN SERUM: CPT

## 2024-06-27 PROCEDURE — 80048 BASIC METABOLIC PNL TOTAL CA: CPT

## 2024-06-27 PROCEDURE — 85027 COMPLETE CBC AUTOMATED: CPT

## 2024-06-27 PROCEDURE — 36415 COLL VENOUS BLD VENIPUNCTURE: CPT

## 2024-06-27 PROCEDURE — 84207 ASSAY OF VITAMIN B-6: CPT

## 2024-06-27 PROCEDURE — 80061 LIPID PANEL: CPT

## 2024-06-27 PROCEDURE — 84165 PROTEIN E-PHORESIS SERUM: CPT

## 2024-06-27 PROCEDURE — 3075F SYST BP GE 130 - 139MM HG: CPT | Performed by: FAMILY MEDICINE

## 2024-06-27 PROCEDURE — 83540 ASSAY OF IRON: CPT

## 2024-06-27 PROCEDURE — 90715 TDAP VACCINE 7 YRS/> IM: CPT | Performed by: FAMILY MEDICINE

## 2024-06-27 PROCEDURE — 1036F TOBACCO NON-USER: CPT | Performed by: FAMILY MEDICINE

## 2024-06-27 PROCEDURE — 82728 ASSAY OF FERRITIN: CPT

## 2024-06-27 PROCEDURE — 82607 VITAMIN B-12: CPT

## 2024-06-27 PROCEDURE — 3080F DIAST BP >= 90 MM HG: CPT | Performed by: FAMILY MEDICINE

## 2024-06-27 PROCEDURE — 84425 ASSAY OF VITAMIN B-1: CPT

## 2024-06-27 NOTE — TELEPHONE ENCOUNTER
----- Message from Milagros Brenner CMA sent at 6/27/2024  8:12 AM EDT -----  Regarding: FW: Follow up  Contact: [phone]    ----- Message -----  From: Milagros Henson  Sent: 6/27/2024   8:06 AM EDT  To: Do James[address] [place] [address] Clinical Support Staff  Subject: Follow up                                        I just realized we didn’t talk about the blood pressure medication. Should I just continue what I’m on until I see the cardiologist? Or are we making any changes before then?

## 2024-06-28 ENCOUNTER — TELEPHONE (OUTPATIENT)
Dept: NEUROSURGERY | Facility: CLINIC | Age: 39
End: 2024-06-28
Payer: COMMERCIAL

## 2024-06-28 LAB
KAPPA LC SERPL-MCNC: 2.12 MG/DL (ref 0.33–1.94)
KAPPA LC/LAMBDA SER: 1 {RATIO} (ref 0.26–1.65)
LAMBDA LC SERPL-MCNC: 2.13 MG/DL (ref 0.57–2.63)

## 2024-06-28 NOTE — TELEPHONE ENCOUNTER
----- Message from Isabel Sandy DO sent at 6/28/2024  9:01 AM EDT -----  Please let the patient know that her B12 is low. Recommend starting 2000 mcg daily for 2 weeks then continuing with 1000 mcg daily. Sublingual is best. Iron studies are improved however not normal and she is still anemia. She should follow up with the managing doctor for her anemia.

## 2024-06-30 LAB — PYRIDOXAL PHOS SERPL-SCNC: 31.3 NMOL/L (ref 20–125)

## 2024-07-01 PROBLEM — I10 PRIMARY HYPERTENSION: Chronic | Status: RESOLVED | Noted: 2024-06-27 | Resolved: 2024-07-01

## 2024-07-01 PROBLEM — R03.0 ELEVATED BLOOD-PRESSURE READING WITHOUT DIAGNOSIS OF HYPERTENSION: Status: RESOLVED | Noted: 2023-06-16 | Resolved: 2024-07-01

## 2024-07-01 LAB
ALBUMIN: 3.9 G/DL (ref 3.4–5)
ALPHA 1 GLOBULIN: 0.4 G/DL (ref 0.2–0.6)
ALPHA 2 GLOBULIN: 0.8 G/DL (ref 0.4–1.1)
BETA GLOBULIN: 0.8 G/DL (ref 0.5–1.2)
GAMMA GLOBULIN: 1.3 G/DL (ref 0.5–1.4)
IMMUNOFIXATION COMMENT: NORMAL
METHYLMALONATE SERPL-SCNC: 0.17 UMOL/L (ref 0–0.4)
PATH REVIEW - SERUM IMMUNOFIXATION: NORMAL
PATH REVIEW-SERUM PROTEIN ELECTROPHORESIS: NORMAL
PROTEIN ELECTROPHORESIS COMMENT: NORMAL

## 2024-07-03 LAB — VIT B1 PYROPHOSHATE BLD-SCNC: 103 NMOL/L (ref 70–180)

## 2024-07-19 ENCOUNTER — OFFICE VISIT (OUTPATIENT)
Dept: CARDIOLOGY | Facility: CLINIC | Age: 39
End: 2024-07-19
Payer: COMMERCIAL

## 2024-07-19 DIAGNOSIS — I10 BENIGN ESSENTIAL HYPERTENSION: Primary | Chronic | ICD-10-CM

## 2024-07-19 DIAGNOSIS — G90.A POTS (POSTURAL ORTHOSTATIC TACHYCARDIA SYNDROME): Chronic | ICD-10-CM

## 2024-07-19 LAB
ATRIAL RATE: 67 BPM
P AXIS: 55 DEGREES
P OFFSET: 186 MS
P ONSET: 141 MS
PR INTERVAL: 162 MS
Q ONSET: 222 MS
QRS COUNT: 12 BEATS
QRS DURATION: 84 MS
QT INTERVAL: 398 MS
QTC CALCULATION(BAZETT): 420 MS
QTC FREDERICIA: 413 MS
R AXIS: 40 DEGREES
T AXIS: 52 DEGREES
T OFFSET: 421 MS
VENTRICULAR RATE: 67 BPM

## 2024-07-19 PROCEDURE — 3008F BODY MASS INDEX DOCD: CPT | Performed by: INTERNAL MEDICINE

## 2024-07-19 PROCEDURE — 99214 OFFICE O/P EST MOD 30 MIN: CPT | Performed by: INTERNAL MEDICINE

## 2024-07-19 PROCEDURE — 3074F SYST BP LT 130 MM HG: CPT | Performed by: INTERNAL MEDICINE

## 2024-07-19 PROCEDURE — 3079F DIAST BP 80-89 MM HG: CPT | Performed by: INTERNAL MEDICINE

## 2024-07-19 PROCEDURE — 93005 ELECTROCARDIOGRAM TRACING: CPT | Performed by: INTERNAL MEDICINE

## 2024-07-19 PROCEDURE — 99204 OFFICE O/P NEW MOD 45 MIN: CPT | Performed by: INTERNAL MEDICINE

## 2024-07-19 RX ORDER — LISINOPRIL 2.5 MG/1
2.5 TABLET ORAL DAILY
Qty: 30 TABLET | Refills: 11 | Status: SHIPPED | OUTPATIENT
Start: 2024-07-19 | End: 2025-07-19

## 2024-07-19 ASSESSMENT — ENCOUNTER SYMPTOMS
DEPRESSION: 0
OCCASIONAL FEELINGS OF UNSTEADINESS: 0
LOSS OF SENSATION IN FEET: 0

## 2024-07-19 ASSESSMENT — PAIN SCALES - GENERAL: PAINLEVEL: 0-NO PAIN

## 2024-07-19 NOTE — PATIENT INSTRUCTIONS
[employer] - blood pressure monitor for home.     Begin Lisinopril 2.5 mg daily for blood pressure..    Follow up with Dr Ramicone in 1-2 months at [facility].

## 2024-07-19 NOTE — PROGRESS NOTES
Reason For Consult    POTS    History Of Present Illness    This is a 39-year-old female with a history of hypertension.  She is being seen today in consultation at the request of Dr. Isabel Sandy for evaluation of POTS.  The patient reports having dizzy spells for several years which occurred generally in the morning when she first wakes up and also during the night if she were to fall asleep on the couch and then stand up and began walking.  She also has been experiencing frequent headaches which began in 2024.  Supplements that she was taking have improved the headaches but she still has the dizziness/lightheaded feeling.  The patient also reports having elevated blood pressure readings.  Autonomic testing was abnormal and suggestive of POTS.  She is not bothered by palpitations or rapid heart rate, however.  It is mainly the feeling of dizziness and lightheadedness when she stands.    Past surgical history:   Social history: Non-smoker, she has decreased caffeine consumption.  Family history: Mother and father both have hypertension     Review of systems  Other review of systems negative other than as outlined in HPI     Social History  She reports that she has never smoked. She has never been exposed to tobacco smoke. She has never used smokeless tobacco. She reports current alcohol use. She reports that she does not use drugs.    Family History  Family History   Problem Relation Name Age of Onset    Hypertension Mother Do ethan     Diabetes Mother Domanish lanzazadesendy     Migraines Mother Domanish lanzazadesendy     Hypertension Father Williams lanzazadesendy     Stroke Father Williams long     Breast cancer Maternal Grandmother Bruna bowen     Diabetes Maternal Grandfather Blair bowen     Kidney disease Maternal Grandfather Blair bowen         Allergies  Patient has no known allergies.    Medications  Current Outpatient Medications   Medication Instructions    cetirizine (ZyrTEC) 10 mg capsule oral     "cholecalciferol (VITAMIN D3) 1,000 Units, oral, Daily    Lactobacillus acidophilus (PROBIOTIC ORAL) oral    MAGNESIUM ORAL oral    multivitamin tablet oral    omeprazole (PRILOSEC) 20 mg, oral, 2 times daily, Do not crush or chew.    omeprazole OTC (PRILOSEC OTC) 20 mg, oral, Daily before breakfast, Do not crush, chew, or split.    propranolol LA (INDERAL LA) 60 mg, oral, Daily    psyllium seed, with sugar, (FIBER ORAL) oral    riboflavin, vitamin B2, (VITAMIN B-2 ORAL) oral    segesterone ac-ethin estradioL (Annovera) 0.15-0.013 mg/24 hour ring vaginal, Every 28 days    triamcinolone (Nasacort) 55 mcg nasal inhaler 2 sprays, Each Nostril, Daily         Vitals      2/1/2024    10:25 AM 2/21/2024     5:07 PM 3/19/2024     7:55 AM 4/22/2024     6:43 AM 5/22/2024     8:01 AM 6/20/2024     7:56 AM 6/27/2024     6:44 AM   Vitals   Systolic 158  162 160 167 151 135   Diastolic 103  98 103 102 104 90   Heart Rate 91  87 96 76 72 77   Temp    37.2 °C (98.9 °F)   36.7 °C (98 °F)   Resp    16 16  14   Height (in) 1.727 m (5' 8\") 1.753 m (5' 9\") 1.727 m (5' 8\")  1.727 m (5' 8\") 1.727 m (5' 8\") 1.727 m (5' 8\")   Weight (lb) 173.5 165 170 172.9 175 175.2 174.5   BMI 26.38 kg/m2 24.37 kg/m2 25.85 kg/m2 26.29 kg/m2 26.61 kg/m2 26.64 kg/m2 26.53 kg/m2   BSA (m2) 1.94 m2 1.91 m2 1.92 m2 1.94 m2 1.95 m2 1.95 m2 1.95 m2   Visit Report Report  Report Report Report Report Report        Physical Exam    General Appearance:  Alert, oriented, no distress  Skin:  Warm and dry  Head and Neck:  No elevation of JVP, no carotid bruits  Cardiac Exam:  Rhythm is regular, S1 and S2 are normal, no murmur S3 or S4  Lungs:  Clear to auscultation  Extremities:  no edema  Neurologic:  No focal deficits  Psychiatric:  Appropriate mood and behavior       Test Results    EKG July 20, 2024: Normal sinus rhythm.  No ischemic changes or ventricular preexcitation.     Assessment/Plan  Problem List Items Addressed This Visit             ICD-10-CM    Benign " essential hypertension - Primary (Chronic) I10     2.  Hypertension: Blood pressure readings have been significantly elevated.  I have recommended the addition of an ACE inhibitor, with lisinopril 2.5 mg once daily.  I have advised Milagros to purchase a blood pressure cuff for regular monitoring of her blood pressure, especially when she is feeling lightheaded and dizzy.  Follow-up in 1 to 2 months.         Relevant Medications    lisinopril 2.5 mg tablet    Other Relevant Orders    ECG 12 lead (Clinic Performed) (Completed)    POTS (postural orthostatic tachycardia syndrome) (Chronic) G90.A     1.  POTS: The patient had an abnormal tilt table test suggestive of POTS.  However, on beta-blocker therapy her heart rate is in the 60s and she is not bothered by palpitations or tachycardia.  I am suspicious that the hypertension she is experiencing is also contributing to the feeling of lightheadedness and dizziness, and has her blood pressure readings have been markedly elevated.  With regard to the POTS, Milagros can continue the propranolol and we can increase the dose if necessary.             Relevant Orders    ECG 12 lead (Clinic Performed) (Completed)             James C Ramicone, DO

## 2024-07-19 NOTE — LETTER
2024     Damaris Zeng MD  [address] [facility]  [facility] [address]    Patient: Milagros Henson   YOB: 1985   Date of Visit: 2024       Dear Dr. Damaris Zeng MD:    Thank you for referring Milagros Henson to me for evaluation. Below are my notes for this consultation.  If you have questions, please do not hesitate to call me. I look forward to following your patient along with you.       Sincerely,     James C Ramicone, DO      CC: Isabel Sandy, DO  ______________________________________________________________________________________    Reason For Consult    POTS    History Of Present Illness    This is a 39-year-old female with a history of hypertension.  She is being seen today in consultation at the request of Dr. Isabel Sandy for evaluation of POTS.  The patient reports having dizzy spells for several years which occurred generally in the morning when she first wakes up and also during the night if she were to fall asleep on the couch and then stand up and began walking.  She also has been experiencing frequent headaches which began in 2024.  Supplements that she was taking have improved the headaches but she still has the dizziness/lightheaded feeling.  The patient also reports having elevated blood pressure readings.  Autonomic testing was abnormal and suggestive of POTS.  She is not bothered by palpitations or rapid heart rate, however.  It is mainly the feeling of dizziness and lightheadedness when she stands.    Past surgical history:   Social history: Non-smoker, she has decreased caffeine consumption.  Family history: Mother and father both have hypertension     Review of systems  Other review of systems negative other than as outlined in HPI     Social History  She reports that she has never smoked. She has never been exposed to tobacco smoke. She has never used smokeless tobacco. She reports current alcohol use. She reports that  "she does not use drugs.    Family History  Family History   Problem Relation Name Age of Onset   • Hypertension Mother Do long    • Diabetes Mother Do long    • Migraines Mother Do long    • Hypertension Father Williams long    • Stroke Father Williams long    • Breast cancer Maternal Grandmother Bruna bowen    • Diabetes Maternal Grandfather Blair bowen    • Kidney disease Maternal Grandfather Blair bowen         Allergies  Patient has no known allergies.    Medications  Current Outpatient Medications   Medication Instructions   • cetirizine (ZyrTEC) 10 mg capsule oral   • cholecalciferol (VITAMIN D3) 1,000 Units, oral, Daily   • Lactobacillus acidophilus (PROBIOTIC ORAL) oral   • MAGNESIUM ORAL oral   • multivitamin tablet oral   • omeprazole (PRILOSEC) 20 mg, oral, 2 times daily, Do not crush or chew.   • omeprazole OTC (PRILOSEC OTC) 20 mg, oral, Daily before breakfast, Do not crush, chew, or split.   • propranolol LA (INDERAL LA) 60 mg, oral, Daily   • psyllium seed, with sugar, (FIBER ORAL) oral   • riboflavin, vitamin B2, (VITAMIN B-2 ORAL) oral   • segesterone ac-ethin estradioL (Annovera) 0.15-0.013 mg/24 hour ring vaginal, Every 28 days   • triamcinolone (Nasacort) 55 mcg nasal inhaler 2 sprays, Each Nostril, Daily         Vitals      2/1/2024    10:25 AM 2/21/2024     5:07 PM 3/19/2024     7:55 AM 4/22/2024     6:43 AM 5/22/2024     8:01 AM 6/20/2024     7:56 AM 6/27/2024     6:44 AM   Vitals   Systolic 158  162 160 167 151 135   Diastolic 103  98 103 102 104 90   Heart Rate 91  87 96 76 72 77   Temp    37.2 °C (98.9 °F)   36.7 °C (98 °F)   Resp    16 16  14   Height (in) 1.727 m (5' 8\") 1.753 m (5' 9\") 1.727 m (5' 8\")  1.727 m (5' 8\") 1.727 m (5' 8\") 1.727 m (5' 8\")   Weight (lb) 173.5 165 170 172.9 175 175.2 174.5   BMI 26.38 kg/m2 24.37 kg/m2 25.85 kg/m2 26.29 kg/m2 26.61 kg/m2 26.64 kg/m2 26.53 kg/m2   BSA (m2) 1.94 m2 1.91 m2 1.92 m2 1.94 m2 1.95 m2 1.95 m2 1.95 m2   Visit " Report Report  Report Report Report Report Report        Physical Exam    General Appearance:  Alert, oriented, no distress  Skin:  Warm and dry  Head and Neck:  No elevation of JVP, no carotid bruits  Cardiac Exam:  Rhythm is regular, S1 and S2 are normal, no murmur S3 or S4  Lungs:  Clear to auscultation  Extremities:  no edema  Neurologic:  No focal deficits  Psychiatric:  Appropriate mood and behavior       Test Results    EKG July 20, 2024: Normal sinus rhythm.  No ischemic changes or ventricular preexcitation.     Assessment/Plan  Problem List Items Addressed This Visit             ICD-10-CM    Benign essential hypertension - Primary (Chronic) I10     2.  Hypertension: Blood pressure readings have been significantly elevated.  I have recommended the addition of an ACE inhibitor, with lisinopril 2.5 mg once daily.  I have advised Milagros to purchase a blood pressure cuff for regular monitoring of her blood pressure, especially when she is feeling lightheaded and dizzy.  Follow-up in 1 to 2 months.         Relevant Medications    lisinopril 2.5 mg tablet    Other Relevant Orders    ECG 12 lead (Clinic Performed) (Completed)    POTS (postural orthostatic tachycardia syndrome) (Chronic) G90.A     1.  POTS: The patient had an abnormal tilt table test suggestive of POTS.  However, on beta-blocker therapy her heart rate is in the 60s and she is not bothered by palpitations or tachycardia.  I am suspicious that the hypertension she is experiencing is also contributing to the feeling of lightheadedness and dizziness, and has her blood pressure readings have been markedly elevated.  With regard to the POTS, Milagros can continue the propranolol and we can increase the dose if necessary.             Relevant Orders    ECG 12 lead (Clinic Performed) (Completed)             James C Ramicone, DO

## 2024-07-20 VITALS
DIASTOLIC BLOOD PRESSURE: 90 MMHG | BODY MASS INDEX: 26.67 KG/M2 | WEIGHT: 176 LBS | HEART RATE: 71 BPM | SYSTOLIC BLOOD PRESSURE: 138 MMHG | OXYGEN SATURATION: 99 % | HEIGHT: 68 IN

## 2024-07-20 NOTE — ASSESSMENT & PLAN NOTE
2.  Hypertension: Blood pressure readings have been significantly elevated.  I have recommended the addition of an ACE inhibitor, with lisinopril 2.5 mg once daily.  I have advised Milagros to purchase a blood pressure cuff for regular monitoring of her blood pressure, especially when she is feeling lightheaded and dizzy.  Follow-up in 1 to 2 months.

## 2024-07-20 NOTE — ASSESSMENT & PLAN NOTE
1.  POTS: The patient had an abnormal tilt table test suggestive of POTS.  However, on beta-blocker therapy her heart rate is in the 60s and she is not bothered by palpitations or tachycardia.  I am suspicious that the hypertension she is experiencing is also contributing to the feeling of lightheadedness and dizziness, and has her blood pressure readings have been markedly elevated.  With regard to the POTS, Milagros can continue the propranolol and we can increase the dose if necessary.

## 2024-07-22 DIAGNOSIS — R03.0 ELEVATED BLOOD-PRESSURE READING WITHOUT DIAGNOSIS OF HYPERTENSION: Chronic | ICD-10-CM

## 2024-07-22 RX ORDER — PROPRANOLOL HYDROCHLORIDE 60 MG/1
60 CAPSULE, EXTENDED RELEASE ORAL DAILY
Qty: 90 CAPSULE | Refills: 0 | Status: SHIPPED | OUTPATIENT
Start: 2024-07-22 | End: 2024-07-23 | Stop reason: SDUPTHER

## 2024-07-23 DIAGNOSIS — R03.0 ELEVATED BLOOD-PRESSURE READING WITHOUT DIAGNOSIS OF HYPERTENSION: Chronic | ICD-10-CM

## 2024-07-23 RX ORDER — PROPRANOLOL HYDROCHLORIDE 60 MG/1
60 CAPSULE, EXTENDED RELEASE ORAL DAILY
Qty: 90 CAPSULE | Refills: 1 | Status: SHIPPED | OUTPATIENT
Start: 2024-07-23 | End: 2025-07-23

## 2024-09-25 ENCOUNTER — OFFICE VISIT (OUTPATIENT)
Dept: CARDIOLOGY | Facility: CLINIC | Age: 39
End: 2024-09-25
Payer: COMMERCIAL

## 2024-09-25 ENCOUNTER — APPOINTMENT (OUTPATIENT)
Dept: OTOLARYNGOLOGY | Facility: CLINIC | Age: 39
End: 2024-09-25
Payer: COMMERCIAL

## 2024-09-25 VITALS
HEIGHT: 68 IN | OXYGEN SATURATION: 100 % | WEIGHT: 181 LBS | SYSTOLIC BLOOD PRESSURE: 134 MMHG | BODY MASS INDEX: 27.43 KG/M2 | DIASTOLIC BLOOD PRESSURE: 99 MMHG | HEART RATE: 86 BPM

## 2024-09-25 DIAGNOSIS — I10 BENIGN ESSENTIAL HYPERTENSION: Primary | Chronic | ICD-10-CM

## 2024-09-25 DIAGNOSIS — G90.A POTS (POSTURAL ORTHOSTATIC TACHYCARDIA SYNDROME): Chronic | ICD-10-CM

## 2024-09-25 PROCEDURE — 99213 OFFICE O/P EST LOW 20 MIN: CPT | Performed by: INTERNAL MEDICINE

## 2024-09-25 PROCEDURE — 3075F SYST BP GE 130 - 139MM HG: CPT | Performed by: INTERNAL MEDICINE

## 2024-09-25 PROCEDURE — 3080F DIAST BP >= 90 MM HG: CPT | Performed by: INTERNAL MEDICINE

## 2024-09-25 PROCEDURE — 3008F BODY MASS INDEX DOCD: CPT | Performed by: INTERNAL MEDICINE

## 2024-09-25 PROCEDURE — 1036F TOBACCO NON-USER: CPT | Performed by: INTERNAL MEDICINE

## 2024-09-25 RX ORDER — LOSARTAN POTASSIUM 25 MG/1
12.5 TABLET ORAL DAILY
COMMUNITY
End: 2024-09-25 | Stop reason: SDUPTHER

## 2024-09-25 RX ORDER — LOSARTAN POTASSIUM 25 MG/1
12.5 TABLET ORAL DAILY
Qty: 45 TABLET | Refills: 3 | Status: SHIPPED | OUTPATIENT
Start: 2024-09-25 | End: 2025-09-25

## 2024-09-25 ASSESSMENT — PAIN SCALES - GENERAL: PAINLEVEL: 0-NO PAIN

## 2024-09-25 NOTE — ASSESSMENT & PLAN NOTE
Milagros developed angioedema on lisinopril and is now on losartan 12.5 mg daily.  I reviewed her blood pressure readings from home and the recent readings are stable on the current dose of losartan.  We will titrate the dose accordingly if she becomes hypertensive.

## 2024-09-25 NOTE — PROGRESS NOTES
History Of Present Illness    This is a 39-year-old female with a history of hypertension.  She presents today for a follow-up evaluation.  Since the time of the initial consultation 2024, the patient underwent a uterine ablation procedure.  She also developed angioedema secondary to lisinopril and she was treated in the emergency department.  She has been monitoring her blood pressure at home and the minimum reading was 98/65 but recent blood pressure readings are 111/80 and 116/76.    Milagros was initially seen in consultation on 2024 at the request of Dr. Isabel Sandy for evaluation of POTS.  The patient reports having dizzy spells for several years which occurred generally in the morning when she first wakes up and also during the night if she were to fall asleep on the couch and then stand up and began walking.  She also has been experiencing frequent headaches which began in 2024.  Supplements that she was taking have improved the headaches but she still has the dizziness/lightheaded feeling.  The patient also reports having elevated blood pressure readings.  Autonomic testing was abnormal and suggestive of POTS.  She is not bothered by palpitations or rapid heart rate, however.  It is mainly the feeling of dizziness and lightheadedness when she stands.    Past surgical history:   Social history: Non-smoker, she has decreased caffeine consumption.  Family history: Mother and father both have hypertension     Review of systems  Other review of systems negative other than as outlined in HPI     Social History  She reports that she has never smoked. She has never been exposed to tobacco smoke. She has never used smokeless tobacco. She reports current alcohol use. She reports that she does not use drugs.    Family History  Family History   Problem Relation Name Age of Onset    Hypertension Mother Do ethan     Diabetes Mother Do ethan     Migraines Mother Do  "ethan     Hypertension Father Williams long     Stroke Father Williams long     Breast cancer Maternal Grandmother Bruna bowen     Diabetes Maternal Grandfather Blair bowen     Kidney disease Maternal Grandfather Blair bowen      Allergies  Patient has no known allergies.    Medications  Current Outpatient Medications   Medication Instructions    cetirizine (ZyrTEC) 10 mg capsule oral    cholecalciferol (VITAMIN D3) 1,000 Units, oral, Daily    Lactobacillus acidophilus (PROBIOTIC ORAL) oral    lisinopril 2.5 mg, oral, Daily    MAGNESIUM ORAL oral    multivitamin tablet oral    omeprazole (PRILOSEC) 20 mg, oral, 2 times daily, Do not crush or chew.    omeprazole OTC (PRILOSEC OTC) 20 mg, oral, Daily before breakfast, Do not crush, chew, or split.    propranolol LA (INDERAL LA) 60 mg, oral, Daily    psyllium seed, with sugar, (FIBER ORAL) oral    riboflavin, vitamin B2, (VITAMIN B-2 ORAL) oral    segesterone ac-ethin estradioL (Annovera) 0.15-0.013 mg/24 hour ring vaginal, Every 28 days    triamcinolone (Nasacort) 55 mcg nasal inhaler 2 sprays, Each Nostril, Daily     Vitals      3/19/2024     7:55 AM 4/22/2024     6:43 AM 5/22/2024     8:01 AM 6/20/2024     7:56 AM 6/27/2024     6:44 AM 7/19/2024     7:59 AM 7/20/2024     8:37 AM   Vitals   Systolic 162 160 167 151 135 128 138   Diastolic 98 103 102 104 90 88 90   Heart Rate 87 96 76 72 77 71    Temp  37.2 °C (98.9 °F)   36.7 °C (98 °F)     Resp  16 16  14     Height (in) 1.727 m (5' 8\")  1.727 m (5' 8\") 1.727 m (5' 8\") 1.727 m (5' 8\") 1.727 m (5' 8\")    Weight (lb) 170 172.9 175 175.2 174.5 176    BMI 25.85 kg/m2 26.29 kg/m2 26.61 kg/m2 26.64 kg/m2 26.53 kg/m2 26.76 kg/m2    BSA (m2) 1.92 m2 1.94 m2 1.95 m2 1.95 m2 1.95 m2 1.96 m2    Visit Report Report Report Report Report Report Report      Physical Exam    General Appearance:  Alert, oriented, no distress  Skin:  Warm and dry  Head and Neck:  No elevation of JVP, no carotid bruits  Cardiac Exam:  Rhythm " is regular, S1 and S2 are normal, no murmur S3 or S4  Lungs:  Clear to auscultation  Extremities:  no edema  Neurologic:  No focal deficits  Psychiatric:  Appropriate mood and behavior    Test Results    EKG July 20, 2024: Normal sinus rhythm.  No ischemic changes or ventricular preexcitation.     Assessment/Plan  Problem List Items Addressed This Visit    None    James C Ramicone, DO

## 2024-11-22 DIAGNOSIS — G90.A POTS (POSTURAL ORTHOSTATIC TACHYCARDIA SYNDROME): Chronic | ICD-10-CM

## 2024-11-22 RX ORDER — LOSARTAN POTASSIUM 25 MG/1
12.5 TABLET ORAL DAILY
Qty: 45 TABLET | Refills: 3 | Status: SHIPPED | OUTPATIENT
Start: 2024-11-22 | End: 2025-11-22

## 2024-12-30 ENCOUNTER — LAB (OUTPATIENT)
Dept: LAB | Facility: LAB | Age: 39
End: 2024-12-30
Payer: COMMERCIAL

## 2024-12-30 DIAGNOSIS — K58.0 IRRITABLE BOWEL SYNDROME WITH DIARRHEA: Primary | ICD-10-CM

## 2024-12-30 DIAGNOSIS — D64.9 ANEMIA, UNSPECIFIED: ICD-10-CM

## 2024-12-30 LAB
ERYTHROCYTE [DISTWIDTH] IN BLOOD BY AUTOMATED COUNT: 12.5 % (ref 11.5–14.5)
FERRITIN SERPL-MCNC: 32 NG/ML (ref 8–150)
HCT VFR BLD AUTO: 40.2 % (ref 36–46)
HGB BLD-MCNC: 12.9 G/DL (ref 12–16)
IRON SATN MFR SERPL: 19 % (ref 25–45)
IRON SERPL-MCNC: 83 UG/DL (ref 35–150)
MCH RBC QN AUTO: 27.3 PG (ref 26–34)
MCHC RBC AUTO-ENTMCNC: 32.1 G/DL (ref 32–36)
MCV RBC AUTO: 85 FL (ref 80–100)
NRBC BLD-RTO: 0 /100 WBCS (ref 0–0)
PLATELET # BLD AUTO: 295 X10*3/UL (ref 150–450)
RBC # BLD AUTO: 4.72 X10*6/UL (ref 4–5.2)
TIBC SERPL-MCNC: 434 UG/DL (ref 240–445)
UIBC SERPL-MCNC: 351 UG/DL (ref 110–370)
WBC # BLD AUTO: 7.2 X10*3/UL (ref 4.4–11.3)

## 2024-12-30 PROCEDURE — 82728 ASSAY OF FERRITIN: CPT

## 2024-12-30 PROCEDURE — 85027 COMPLETE CBC AUTOMATED: CPT

## 2024-12-30 PROCEDURE — 83540 ASSAY OF IRON: CPT

## 2024-12-30 PROCEDURE — 83550 IRON BINDING TEST: CPT

## 2025-01-02 DIAGNOSIS — R03.0 ELEVATED BLOOD-PRESSURE READING WITHOUT DIAGNOSIS OF HYPERTENSION: Chronic | ICD-10-CM

## 2025-01-02 RX ORDER — PROPRANOLOL HYDROCHLORIDE 60 MG/1
60 CAPSULE, EXTENDED RELEASE ORAL DAILY
Qty: 90 CAPSULE | Refills: 1 | Status: SHIPPED | OUTPATIENT
Start: 2025-01-02 | End: 2025-07-01

## 2025-01-13 ENCOUNTER — OFFICE (OUTPATIENT)
Dept: URBAN - METROPOLITAN AREA CLINIC 26 | Facility: CLINIC | Age: 40
End: 2025-01-13
Payer: COMMERCIAL

## 2025-01-13 VITALS
HEIGHT: 68 IN | SYSTOLIC BLOOD PRESSURE: 123 MMHG | DIASTOLIC BLOOD PRESSURE: 87 MMHG | WEIGHT: 187 LBS | HEART RATE: 83 BPM | TEMPERATURE: 98.2 F

## 2025-01-13 DIAGNOSIS — K22.70 BARRETT'S ESOPHAGUS WITHOUT DYSPLASIA: ICD-10-CM

## 2025-01-13 DIAGNOSIS — K58.0 IRRITABLE BOWEL SYNDROME WITH DIARRHEA: ICD-10-CM

## 2025-01-13 DIAGNOSIS — E61.1 IRON DEFICIENCY: ICD-10-CM

## 2025-01-13 DIAGNOSIS — Z87.11 PERSONAL HISTORY OF PEPTIC ULCER DISEASE: ICD-10-CM

## 2025-01-13 DIAGNOSIS — K21.9 GASTRO-ESOPHAGEAL REFLUX DISEASE WITHOUT ESOPHAGITIS: ICD-10-CM

## 2025-01-13 PROCEDURE — 99214 OFFICE O/P EST MOD 30 MIN: CPT | Performed by: CLINICAL NURSE SPECIALIST

## 2025-01-13 RX ORDER — SODIUM PICOSULFATE, MAGNESIUM OXIDE, AND ANHYDROUS CITRIC ACID 12; 3.5; 1 G/175ML; G/175ML; MG/175ML
LIQUID ORAL
Qty: 350 | Refills: 0 | Status: ACTIVE
Start: 2025-01-13

## 2025-05-23 ENCOUNTER — AMBULATORY SURGICAL CENTER (OUTPATIENT)
Dept: URBAN - METROPOLITAN AREA SURGERY 12 | Facility: SURGERY | Age: 40
End: 2025-05-23
Payer: COMMERCIAL

## 2025-05-23 VITALS
SYSTOLIC BLOOD PRESSURE: 119 MMHG | DIASTOLIC BLOOD PRESSURE: 97 MMHG | HEART RATE: 72 BPM | RESPIRATION RATE: 14 BRPM | DIASTOLIC BLOOD PRESSURE: 57 MMHG | DIASTOLIC BLOOD PRESSURE: 82 MMHG | HEART RATE: 70 BPM | DIASTOLIC BLOOD PRESSURE: 82 MMHG | RESPIRATION RATE: 14 BRPM | HEART RATE: 71 BPM | SYSTOLIC BLOOD PRESSURE: 135 MMHG | SYSTOLIC BLOOD PRESSURE: 101 MMHG | RESPIRATION RATE: 16 BRPM | RESPIRATION RATE: 19 BRPM | DIASTOLIC BLOOD PRESSURE: 53 MMHG | SYSTOLIC BLOOD PRESSURE: 107 MMHG | HEART RATE: 69 BPM | HEART RATE: 67 BPM | DIASTOLIC BLOOD PRESSURE: 59 MMHG | SYSTOLIC BLOOD PRESSURE: 98 MMHG | OXYGEN SATURATION: 100 % | OXYGEN SATURATION: 97 % | HEART RATE: 67 BPM | DIASTOLIC BLOOD PRESSURE: 67 MMHG | DIASTOLIC BLOOD PRESSURE: 58 MMHG | HEART RATE: 74 BPM | SYSTOLIC BLOOD PRESSURE: 119 MMHG | SYSTOLIC BLOOD PRESSURE: 107 MMHG | RESPIRATION RATE: 18 BRPM | RESPIRATION RATE: 18 BRPM | DIASTOLIC BLOOD PRESSURE: 57 MMHG | HEIGHT: 68 IN | OXYGEN SATURATION: 99 % | SYSTOLIC BLOOD PRESSURE: 130 MMHG | HEART RATE: 68 BPM | OXYGEN SATURATION: 98 % | HEIGHT: 68 IN | SYSTOLIC BLOOD PRESSURE: 90 MMHG | HEART RATE: 73 BPM | OXYGEN SATURATION: 98 % | SYSTOLIC BLOOD PRESSURE: 135 MMHG | RESPIRATION RATE: 19 BRPM | DIASTOLIC BLOOD PRESSURE: 72 MMHG | RESPIRATION RATE: 20 BRPM | HEART RATE: 70 BPM | SYSTOLIC BLOOD PRESSURE: 100 MMHG | SYSTOLIC BLOOD PRESSURE: 100 MMHG | SYSTOLIC BLOOD PRESSURE: 90 MMHG | SYSTOLIC BLOOD PRESSURE: 121 MMHG | HEART RATE: 65 BPM | HEART RATE: 65 BPM | DIASTOLIC BLOOD PRESSURE: 61 MMHG | RESPIRATION RATE: 23 BRPM | OXYGEN SATURATION: 100 % | DIASTOLIC BLOOD PRESSURE: 61 MMHG | DIASTOLIC BLOOD PRESSURE: 79 MMHG | DIASTOLIC BLOOD PRESSURE: 67 MMHG | OXYGEN SATURATION: 99 % | RESPIRATION RATE: 17 BRPM | HEART RATE: 68 BPM | SYSTOLIC BLOOD PRESSURE: 98 MMHG | WEIGHT: 180 LBS | SYSTOLIC BLOOD PRESSURE: 91 MMHG | SYSTOLIC BLOOD PRESSURE: 97 MMHG | SYSTOLIC BLOOD PRESSURE: 117 MMHG | WEIGHT: 180 LBS | DIASTOLIC BLOOD PRESSURE: 59 MMHG | RESPIRATION RATE: 12 BRPM | SYSTOLIC BLOOD PRESSURE: 101 MMHG | RESPIRATION RATE: 12 BRPM | RESPIRATION RATE: 17 BRPM | SYSTOLIC BLOOD PRESSURE: 97 MMHG | HEART RATE: 69 BPM | SYSTOLIC BLOOD PRESSURE: 145 MMHG | HEART RATE: 71 BPM | HEART RATE: 73 BPM | DIASTOLIC BLOOD PRESSURE: 81 MMHG | SYSTOLIC BLOOD PRESSURE: 91 MMHG | DIASTOLIC BLOOD PRESSURE: 75 MMHG | DIASTOLIC BLOOD PRESSURE: 79 MMHG | DIASTOLIC BLOOD PRESSURE: 75 MMHG | RESPIRATION RATE: 11 BRPM | DIASTOLIC BLOOD PRESSURE: 72 MMHG | OXYGEN SATURATION: 97 % | DIASTOLIC BLOOD PRESSURE: 58 MMHG | DIASTOLIC BLOOD PRESSURE: 53 MMHG | RESPIRATION RATE: 20 BRPM | RESPIRATION RATE: 7 BRPM | SYSTOLIC BLOOD PRESSURE: 145 MMHG | DIASTOLIC BLOOD PRESSURE: 97 MMHG | SYSTOLIC BLOOD PRESSURE: 121 MMHG | RESPIRATION RATE: 7 BRPM | SYSTOLIC BLOOD PRESSURE: 130 MMHG | HEART RATE: 74 BPM | SYSTOLIC BLOOD PRESSURE: 117 MMHG | DIASTOLIC BLOOD PRESSURE: 81 MMHG | RESPIRATION RATE: 23 BRPM | HEART RATE: 72 BPM | RESPIRATION RATE: 11 BRPM | RESPIRATION RATE: 16 BRPM

## 2025-05-23 DIAGNOSIS — K22.2 ESOPHAGEAL OBSTRUCTION: ICD-10-CM

## 2025-05-23 DIAGNOSIS — K64.4 RESIDUAL HEMORRHOIDAL SKIN TAGS: ICD-10-CM

## 2025-05-23 DIAGNOSIS — K29.70 GASTRITIS, UNSPECIFIED, WITHOUT BLEEDING: ICD-10-CM

## 2025-05-23 DIAGNOSIS — R19.7 DIARRHEA, UNSPECIFIED: ICD-10-CM

## 2025-05-23 DIAGNOSIS — K22.89 OTHER SPECIFIED DISEASE OF ESOPHAGUS: ICD-10-CM

## 2025-05-23 DIAGNOSIS — K63.89 OTHER SPECIFIED DISEASES OF INTESTINE: ICD-10-CM

## 2025-05-23 DIAGNOSIS — K64.8 OTHER HEMORRHOIDS: ICD-10-CM

## 2025-05-23 DIAGNOSIS — K44.9 DIAPHRAGMATIC HERNIA WITHOUT OBSTRUCTION OR GANGRENE: ICD-10-CM

## 2025-05-23 DIAGNOSIS — K21.00 GASTRO-ESOPHAGEAL REFLUX DISEASE WITH ESOPHAGITIS, WITHOUT B: ICD-10-CM

## 2025-05-23 DIAGNOSIS — E61.1 IRON DEFICIENCY: ICD-10-CM

## 2025-05-23 PROCEDURE — 43450 DILATE ESOPHAGUS 1/MULT PASS: CPT | Performed by: INTERNAL MEDICINE

## 2025-05-23 PROCEDURE — 43239 EGD BIOPSY SINGLE/MULTIPLE: CPT | Mod: 51 | Performed by: INTERNAL MEDICINE

## 2025-05-23 PROCEDURE — 45380 COLONOSCOPY AND BIOPSY: CPT | Performed by: INTERNAL MEDICINE

## 2025-06-24 NOTE — PROGRESS NOTES
Subjective      HPI:          Milagros Henson is a 40 y.o. female 40 y.o. is here today for PE/health maintenance      Chief Complaint   Patient presents with    Annual Exam           Pt also due for f/up chronic medical problems-  BP       Patient is doing well      Due for labs             Health maintenance:   And General guidelines           TDAP-- 2024  Pap - screening for cervical cancer-Dr Farrell  21-29 years old: Get a Pap test every three years   30-65 years old: Get a Pap test and an HPV test (co-test) every five years, or a Pap test alone every three years   65 years or older: Do not need screening if you have no history of cervical changes and have had three negative Pap tests in a row, two negative HPV tests in a row, or two negative co-test results in a row within the past 10 years   <25 yrs - GC/chlamydia screen  Mammo- (40-74)- Dr Farrell   Hepatitis C Screen- (18-79, once in a lifetime) - 2022  HIV screen (15-65, once in a lifetime) )-2024  Pre-DM-- HbA1c:   Screen Lipid Panel-- ratio:   Colonoscopy (45-75)--         Other medical specialists are involved in patient's care.    Any recent notes that were available were reviewed.    All conditions are monitored, evaluated and assessed regularly.    Patient is compliant with current treatment regimen/medications.    Specialists involved include:      Dr Harman cui has had ablation done     Dr Ramicone - PN 9/25/24      Health Maintenance Topics       Topic Date     Mammogram Dr Farrell      Health Maintenance Topics        Topic Date     Yearly Adult Physical today     Health Maintenance Topics with due status: Not Due       Topic Last Completion Date    Cervical Cancer Screening- Dr Farrell  05/08/2024    DTaP/Tdap/Td Vaccines 06/27/2024     Health Maintenance Topics with due status: Completed       Topic Last Completion Date    Hepatitis C Screening 06/20/2022    HIV Screening 06/27/2024                 Immunization History   Administered  Date(s) Administered    Flu vaccine, trivalent, preservative free, age 6 months and greater (Fluarix/Fluzone/Flulaval) 10/20/2014    Influenza, Unspecified 10/20/2014    Moderna SARS-CoV-2 Vaccination 04/01/2021, 04/29/2021    Tdap vaccine, age 7 year and older (BOOSTRIX, ADACEL) 10/20/2014, 06/27/2024         Tobacco Use History[1]                  Social History     Substance and Sexual Activity   Alcohol Use Yes    Comment: 15 drinks over 7 days        Labs reported in this progress note have been reviewed at or prior to this office visit.      Lab on 12/30/2024   Component Date Value Ref Range Status    WBC 12/30/2024 7.2  4.4 - 11.3 x10*3/uL Final    nRBC 12/30/2024 0.0  0.0 - 0.0 /100 WBCs Final    RBC 12/30/2024 4.72  4.00 - 5.20 x10*6/uL Final    Hemoglobin 12/30/2024 12.9  12.0 - 16.0 g/dL Final    Hematocrit 12/30/2024 40.2  36.0 - 46.0 % Final    MCV 12/30/2024 85  80 - 100 fL Final    MCH 12/30/2024 27.3  26.0 - 34.0 pg Final    MCHC 12/30/2024 32.1  32.0 - 36.0 g/dL Final    RDW 12/30/2024 12.5  11.5 - 14.5 % Final    Platelets 12/30/2024 295  150 - 450 x10*3/uL Final    Iron 12/30/2024 83  35 - 150 ug/dL Final    UIBC 12/30/2024 351  110 - 370 ug/dL Final    TIBC 12/30/2024 434  240 - 445 ug/dL Final    % Saturation 12/30/2024 19 (L)  25 - 45 % Final    Ferritin 12/30/2024 32  8 - 150 ng/mL Final   Office Visit on 07/19/2024   Component Date Value Ref Range Status    Ventricular Rate 07/19/2024 67  BPM Final    Atrial Rate 07/19/2024 67  BPM Final    W[place] Interval 07/19/2024 162  ms Final    QRS Duration 07/19/2024 84  ms Final    QT Interval 07/19/2024 398  ms Final    QTC Calculation(Bazett) 07/19/2024 420  ms Final    P Axis 07/19/2024 55  degrees Final    R Axis 07/19/2024 40  degrees Final    T Axis 07/19/2024 52  degrees Final    QRS Count 07/19/2024 12  beats Final    Q Onset 07/19/2024 222  ms Final    P Onset 07/19/2024 141  ms Final    P Offset 07/19/2024 186  ms Final    T Offset 07/19/2024  "421  ms Final    QTC Fredericia 07/19/2024 413  ms Final           Current Medications[2]      The following portions of the patient's chart were reviewed in this encounter and updated as appropriate:  Tobacco  Allergies  Meds  Problems  Med Hx  Surg Hx       Family History[3]      Cancer-related family history includes Breast cancer in her maternal grandmother.          Lifestyle and medical management to decrease cardiovascular risks.    Review of Systems      Review of Systems        Objective        PE:          Visit Vitals  /88 (BP Location: Left arm, Patient Position: Sitting, BP Cuff Size: Adult)   Pulse 73   Temp 37.1 °C (98.7 °F) (Temporal)   Resp 14   Ht 1.74 m (5' 8.5\")   Wt 86.2 kg (190 lb)   SpO2 98%   BMI 28.47 kg/m²   OB Status Having periods   Smoking Status Never   BSA 2.04 m²                    Pt is A and O x3, NAD  Head- normocephalic and atraumatic,   EYES- conjunctiva- normal   lids- normal  EARS/NOSE- TM's normal, nasopharynx- normal and atraumatic  OROPHARYNX- normal  NECK- supple, FROM  THYROID- NT, normal size, no nodule noted  LYMPH- no cervical lymph nodes palpated   CV- RRR without murmur  PULM- CTA bilaterally, normal respiratory effort  RESPIRATORY EFFORT- normal , no retractions or nasal flaring   ABD- normoactive BS's , soft , NT, no hepatosplenomegaly palpated  EXT- no edema,NT  SKIN- no abnormal skin lesions noted  NEURO- no focal deficits  PSYCH- pleasant, normal judgement and insight    BP Readings from Last 3 Encounters:   06/27/25 115/88   09/25/24 (!) 134/99   07/20/24 138/90         Wt Readings from Last 3 Encounters:   06/27/25 86.2 kg (190 lb)   09/25/24 82.1 kg (181 lb)   07/19/24 79.8 kg (176 lb)         BMI Readings from Last 3 Encounters:   06/27/25 28.47 kg/m²   09/25/24 27.52 kg/m²   07/19/24 26.76 kg/m²       The number and complexity of problems addressed is considered moderate.  The amount and/or complexity of data reviewed and analyzed is considered " moderate. The risk of complications and/or morbidity/mortality of patient is considered moderate. Overall, this patient encounter is considered a moderate risk visit.    Side effects of ARBs include:    headache.  fainting.  dizziness.  fatigue.  respiratory symptoms.  vomiting and diarrhea.  back pain.  leg swelling.      Assessment/Plan        Assessment & Plan  Well adult exam      Primary hypertension  Dr Ramicone   Losartan   Propranolol        Screening for diabetes mellitus    Orders:    Glucose; Future    Screening for cholesterol level    Orders:    Lipid Panel; Future    Weight gain  Off birthcontrol  Exercising- walking and elliptical   Normal diet  15 pounds in past year  Discussed calorie Linq3    Weight watchers emmanuel       Orders:    TSH; Future                         Follow up 12 months - PE            Recommendations for women annual wellness exam:   Make sure screenings for cervical and breast cancer are up to date if applicable- pap smears age 21-65  mammogram starting at age 35- 40 or sooner if positive family history of breast cancer   colonoscopy at age 45, earlier if positive family history for breast or colon cancer   Screen for osteoporosis with DXA bone scan starting at age 65 or sooner if risk factors present (long term steroid use, smoking, heavy alcohol use, history of fracture, rheumatoid arthritis, low body weight, family history of hip fracture)  Screening for lung cancer with low-dose CT in all adults age 50-80 who have a 20 pack-year smoking history and currently smoke or have quit within the past 15 years; and are symptom free/no prior pulmonary diagnosis  Gardisil vaccine age 9-26 years of age   Follow a healthy diet (Examples, Dash diet, Mediterranean diet)  Exercise 150 minutes per week   Maintain healthy weight (BMI < 25)  Do not smoke   Alcohol in moderation (up to 1 drink/day)  Get enough sleep (7-8 hours/night)  Take a prenatal vitamin with folic acid if possibility of  pregnancy   Make sure immunizations are up to date   Recommend minimum 1,000 mg calcium and 600-800 IU vitamin D daily (combination of diet + supplement)- unless there is a contraindication   Visit dentist twice yearly      If you are less than 60 years old, have diabetes mellitus, or chronic kidney disease, your goal blood pressure is < 140/90.  If you are older than 60 years old and do not have diabetes or kidney disease, your goal blood pressure is < 150/90.            [1]   Social History  Tobacco Use   Smoking Status Never    Passive exposure: Never   Smokeless Tobacco Never   [2]   Current Outpatient Medications:     cetirizine (ZyrTEC) 10 mg capsule, Take by mouth., Disp: , Rfl:     cholecalciferol (Vitamin D3) 25 MCG (1000 UT) tablet, Take 1 tablet (1,000 Units) by mouth once daily., Disp: , Rfl:     Lactobacillus acidophilus (PROBIOTIC ORAL), Take by mouth., Disp: , Rfl:     losartan (Cozaar) 25 mg tablet, Take 0.5 tablets (12.5 mg) by mouth once daily., Disp: 45 tablet, Rfl: 3    MAGNESIUM ORAL, Take by mouth., Disp: , Rfl:     multivitamin tablet, Take by mouth., Disp: , Rfl:     omeprazole OTC (PriLOSEC OTC) 20 mg EC tablet, Take 1 tablet (20 mg) by mouth once daily in the morning. Take before meals. Do not crush, chew, or split., Disp: , Rfl:     propranolol LA (Inderal LA) 60 mg 24 hr capsule, Take 1 capsule (60 mg) by mouth once daily., Disp: 90 capsule, Rfl: 1    psyllium seed, with sugar, (FIBER ORAL), Take by mouth., Disp: , Rfl:     riboflavin, vitamin B2, (VITAMIN B-2 ORAL), Take by mouth., Disp: , Rfl:     omeprazole (PriLOSEC) 20 mg DR capsule, Take 1 capsule (20 mg) by mouth 2 times a day. Do not crush or chew., Disp: 60 capsule, Rfl: 1  [3]   Family History  Problem Relation Name Age of Onset    Hypertension Mother Do oprzadek     Diabetes Mother Do oprzadek     Migraines Mother Do oprzadek     Lupus Mother Do oprzadek     Hypertension Father Williams oprzadek     Stroke Father Williams  oprzadek     Breast cancer Maternal Grandmother Bruna bowen     Diabetes Maternal Grandfather Blair bowen     Kidney disease Maternal Grandfather Blair bowen

## 2025-06-27 ENCOUNTER — APPOINTMENT (OUTPATIENT)
Dept: PRIMARY CARE | Facility: CLINIC | Age: 40
End: 2025-06-27
Payer: COMMERCIAL

## 2025-06-27 VITALS
OXYGEN SATURATION: 98 % | BODY MASS INDEX: 28.14 KG/M2 | HEART RATE: 73 BPM | DIASTOLIC BLOOD PRESSURE: 88 MMHG | SYSTOLIC BLOOD PRESSURE: 115 MMHG | HEIGHT: 69 IN | RESPIRATION RATE: 14 BRPM | TEMPERATURE: 98.7 F | WEIGHT: 190 LBS

## 2025-06-27 DIAGNOSIS — Z13.220 SCREENING FOR CHOLESTEROL LEVEL: Chronic | ICD-10-CM

## 2025-06-27 DIAGNOSIS — Z13.1 SCREENING FOR DIABETES MELLITUS: Chronic | ICD-10-CM

## 2025-06-27 DIAGNOSIS — R03.0 ELEVATED BLOOD-PRESSURE READING WITHOUT DIAGNOSIS OF HYPERTENSION: Chronic | ICD-10-CM

## 2025-06-27 DIAGNOSIS — R63.5 WEIGHT GAIN: Chronic | ICD-10-CM

## 2025-06-27 DIAGNOSIS — I10 PRIMARY HYPERTENSION: Chronic | ICD-10-CM

## 2025-06-27 DIAGNOSIS — Z00.00 WELL ADULT EXAM: Primary | Chronic | ICD-10-CM

## 2025-06-27 PROCEDURE — 1036F TOBACCO NON-USER: CPT | Performed by: FAMILY MEDICINE

## 2025-06-27 PROCEDURE — 99396 PREV VISIT EST AGE 40-64: CPT | Performed by: FAMILY MEDICINE

## 2025-06-27 PROCEDURE — 3008F BODY MASS INDEX DOCD: CPT | Performed by: FAMILY MEDICINE

## 2025-06-27 PROCEDURE — 3079F DIAST BP 80-89 MM HG: CPT | Performed by: FAMILY MEDICINE

## 2025-06-27 PROCEDURE — 3074F SYST BP LT 130 MM HG: CPT | Performed by: FAMILY MEDICINE

## 2025-06-27 RX ORDER — PROPRANOLOL HYDROCHLORIDE 60 MG/1
60 CAPSULE, EXTENDED RELEASE ORAL DAILY
Qty: 90 CAPSULE | Refills: 3 | Status: SHIPPED | OUTPATIENT
Start: 2025-06-27 | End: 2025-12-24

## 2025-06-27 NOTE — ASSESSMENT & PLAN NOTE
Off birthcontrol  Exercising- walking and elliptical   Normal diet  15 pounds in past year  Discussed calorie journal    Weight watchers emmanuel       Orders:    TSH; Future

## 2025-07-26 ENCOUNTER — APPOINTMENT (OUTPATIENT)
Dept: RADIOLOGY | Facility: HOSPITAL | Age: 40
End: 2025-07-26
Payer: COMMERCIAL

## 2025-07-26 DIAGNOSIS — Z12.31 ENCOUNTER FOR SCREENING MAMMOGRAM FOR MALIGNANT NEOPLASM OF BREAST: ICD-10-CM

## 2025-07-26 PROCEDURE — 77067 SCR MAMMO BI INCL CAD: CPT | Performed by: RADIOLOGY

## 2025-07-26 PROCEDURE — 77063 BREAST TOMOSYNTHESIS BI: CPT

## 2025-07-26 PROCEDURE — 77063 BREAST TOMOSYNTHESIS BI: CPT | Performed by: RADIOLOGY

## 2025-07-27 LAB
CHOLEST SERPL-MCNC: 179 MG/DL
CHOLEST/HDLC SERPL: 2.8 (CALC)
GLUCOSE SERPL-MCNC: 95 MG/DL (ref 65–99)
HDLC SERPL-MCNC: 65 MG/DL
LDLC SERPL CALC-MCNC: 90 MG/DL (CALC)
NONHDLC SERPL-MCNC: 114 MG/DL (CALC)
TRIGL SERPL-MCNC: 137 MG/DL
TSH SERPL-ACNC: 1.54 MIU/L

## 2025-09-03 DIAGNOSIS — R03.0 ELEVATED BLOOD-PRESSURE READING WITHOUT DIAGNOSIS OF HYPERTENSION: Chronic | ICD-10-CM

## 2025-09-03 RX ORDER — PROPRANOLOL HYDROCHLORIDE 60 MG/1
60 CAPSULE, EXTENDED RELEASE ORAL DAILY
Qty: 90 CAPSULE | Refills: 3 | Status: SHIPPED | OUTPATIENT
Start: 2025-09-03

## 2025-09-10 ENCOUNTER — APPOINTMENT (OUTPATIENT)
Dept: CARDIOLOGY | Facility: CLINIC | Age: 40
End: 2025-09-10
Payer: COMMERCIAL

## 2025-10-03 ENCOUNTER — APPOINTMENT (OUTPATIENT)
Dept: CARDIOLOGY | Facility: CLINIC | Age: 40
End: 2025-10-03
Payer: COMMERCIAL

## 2025-10-20 ENCOUNTER — APPOINTMENT (OUTPATIENT)
Dept: CARDIOLOGY | Facility: CLINIC | Age: 40
End: 2025-10-20
Payer: COMMERCIAL

## 2026-06-29 ENCOUNTER — APPOINTMENT (OUTPATIENT)
Dept: PRIMARY CARE | Facility: CLINIC | Age: 41
End: 2026-06-29
Payer: COMMERCIAL